# Patient Record
Sex: MALE | Race: WHITE | NOT HISPANIC OR LATINO | Employment: OTHER | URBAN - NONMETROPOLITAN AREA
[De-identification: names, ages, dates, MRNs, and addresses within clinical notes are randomized per-mention and may not be internally consistent; named-entity substitution may affect disease eponyms.]

---

## 2018-11-26 ENCOUNTER — APPOINTMENT (OUTPATIENT)
Dept: GENERAL RADIOLOGY | Facility: OTHER | Age: 73
End: 2018-11-26
Attending: STUDENT IN AN ORGANIZED HEALTH CARE EDUCATION/TRAINING PROGRAM
Payer: MEDICARE

## 2018-11-26 ENCOUNTER — HOSPITAL ENCOUNTER (EMERGENCY)
Facility: OTHER | Age: 73
Discharge: ANOTHER HOSPITAL | End: 2018-11-26
Attending: STUDENT IN AN ORGANIZED HEALTH CARE EDUCATION/TRAINING PROGRAM | Admitting: STUDENT IN AN ORGANIZED HEALTH CARE EDUCATION/TRAINING PROGRAM
Payer: MEDICARE

## 2018-11-26 VITALS
RESPIRATION RATE: 16 BRPM | OXYGEN SATURATION: 95 % | SYSTOLIC BLOOD PRESSURE: 170 MMHG | DIASTOLIC BLOOD PRESSURE: 107 MMHG | HEIGHT: 74 IN | WEIGHT: 270 LBS | HEART RATE: 79 BPM | BODY MASS INDEX: 34.65 KG/M2 | TEMPERATURE: 96.8 F

## 2018-11-26 DIAGNOSIS — I50.43 ACUTE ON CHRONIC COMBINED SYSTOLIC AND DIASTOLIC CONGESTIVE HEART FAILURE (H): ICD-10-CM

## 2018-11-26 DIAGNOSIS — I21.4 NON-STEMI (NON-ST ELEVATED MYOCARDIAL INFARCTION) (H): Primary | ICD-10-CM

## 2018-11-26 DIAGNOSIS — T78.3XXA ANGIOEDEMA, INITIAL ENCOUNTER: ICD-10-CM

## 2018-11-26 LAB
ALBUMIN SERPL-MCNC: 3.7 G/DL (ref 3.5–5.7)
ALP SERPL-CCNC: 63 U/L (ref 34–104)
ALT SERPL W P-5'-P-CCNC: 10 U/L (ref 7–52)
ANION GAP SERPL CALCULATED.3IONS-SCNC: 6 MMOL/L (ref 3–14)
AST SERPL W P-5'-P-CCNC: 13 U/L (ref 13–39)
BASOPHILS # BLD AUTO: 0.1 10E9/L (ref 0–0.2)
BASOPHILS NFR BLD AUTO: 1.3 %
BILIRUB SERPL-MCNC: 0.7 MG/DL (ref 0.3–1)
BUN SERPL-MCNC: 17 MG/DL (ref 7–25)
CALCIUM SERPL-MCNC: 8.9 MG/DL (ref 8.6–10.3)
CHLORIDE SERPL-SCNC: 104 MMOL/L (ref 98–107)
CO2 SERPL-SCNC: 26 MMOL/L (ref 21–31)
CREAT SERPL-MCNC: 0.92 MG/DL (ref 0.7–1.3)
DIFFERENTIAL METHOD BLD: NORMAL
EOSINOPHIL # BLD AUTO: 0.2 10E9/L (ref 0–0.7)
EOSINOPHIL NFR BLD AUTO: 3.1 %
ERYTHROCYTE [DISTWIDTH] IN BLOOD BY AUTOMATED COUNT: 12.9 % (ref 10–15)
GFR SERPL CREATININE-BSD FRML MDRD: 81 ML/MIN/1.7M2
GLUCOSE SERPL-MCNC: 279 MG/DL (ref 70–105)
HCT VFR BLD AUTO: 47.4 % (ref 40–53)
HGB BLD-MCNC: 16.4 G/DL (ref 13.3–17.7)
IMM GRANULOCYTES # BLD: 0 10E9/L (ref 0–0.4)
IMM GRANULOCYTES NFR BLD: 0.3 %
LYMPHOCYTES # BLD AUTO: 1.7 10E9/L (ref 0.8–5.3)
LYMPHOCYTES NFR BLD AUTO: 22.5 %
MCH RBC QN AUTO: 29.5 PG (ref 26.5–33)
MCHC RBC AUTO-ENTMCNC: 34.6 G/DL (ref 31.5–36.5)
MCV RBC AUTO: 85 FL (ref 78–100)
MONOCYTES # BLD AUTO: 0.6 10E9/L (ref 0–1.3)
MONOCYTES NFR BLD AUTO: 7.4 %
NEUTROPHILS # BLD AUTO: 5.1 10E9/L (ref 1.6–8.3)
NEUTROPHILS NFR BLD AUTO: 65.4 %
NT-PROBNP SERPL-MCNC: 195 PG/ML (ref 0–100)
PLATELET # BLD AUTO: 275 10E9/L (ref 150–450)
POTASSIUM SERPL-SCNC: 4.2 MMOL/L (ref 3.5–5.1)
PROT SERPL-MCNC: 6.2 G/DL (ref 6.4–8.9)
RBC # BLD AUTO: 5.55 10E12/L (ref 4.4–5.9)
SODIUM SERPL-SCNC: 136 MMOL/L (ref 134–144)
TROPONIN I SERPL-MCNC: 0.09 UG/L (ref 0–0.03)
WBC # BLD AUTO: 7.7 10E9/L (ref 4–11)

## 2018-11-26 PROCEDURE — A9270 NON-COVERED ITEM OR SERVICE: HCPCS | Mod: GZ | Performed by: STUDENT IN AN ORGANIZED HEALTH CARE EDUCATION/TRAINING PROGRAM

## 2018-11-26 PROCEDURE — 25000132 ZZH RX MED GY IP 250 OP 250 PS 637: Performed by: STUDENT IN AN ORGANIZED HEALTH CARE EDUCATION/TRAINING PROGRAM

## 2018-11-26 PROCEDURE — 71046 X-RAY EXAM CHEST 2 VIEWS: CPT

## 2018-11-26 PROCEDURE — 36415 COLL VENOUS BLD VENIPUNCTURE: CPT | Performed by: STUDENT IN AN ORGANIZED HEALTH CARE EDUCATION/TRAINING PROGRAM

## 2018-11-26 PROCEDURE — 83880 ASSAY OF NATRIURETIC PEPTIDE: CPT | Performed by: STUDENT IN AN ORGANIZED HEALTH CARE EDUCATION/TRAINING PROGRAM

## 2018-11-26 PROCEDURE — 93010 ELECTROCARDIOGRAM REPORT: CPT | Performed by: INTERNAL MEDICINE

## 2018-11-26 PROCEDURE — 25000125 ZZHC RX 250: Performed by: STUDENT IN AN ORGANIZED HEALTH CARE EDUCATION/TRAINING PROGRAM

## 2018-11-26 PROCEDURE — 93005 ELECTROCARDIOGRAM TRACING: CPT | Performed by: STUDENT IN AN ORGANIZED HEALTH CARE EDUCATION/TRAINING PROGRAM

## 2018-11-26 PROCEDURE — 96376 TX/PRO/DX INJ SAME DRUG ADON: CPT | Performed by: STUDENT IN AN ORGANIZED HEALTH CARE EDUCATION/TRAINING PROGRAM

## 2018-11-26 PROCEDURE — 80053 COMPREHEN METABOLIC PANEL: CPT | Performed by: STUDENT IN AN ORGANIZED HEALTH CARE EDUCATION/TRAINING PROGRAM

## 2018-11-26 PROCEDURE — 84484 ASSAY OF TROPONIN QUANT: CPT | Performed by: STUDENT IN AN ORGANIZED HEALTH CARE EDUCATION/TRAINING PROGRAM

## 2018-11-26 PROCEDURE — 96375 TX/PRO/DX INJ NEW DRUG ADDON: CPT | Performed by: STUDENT IN AN ORGANIZED HEALTH CARE EDUCATION/TRAINING PROGRAM

## 2018-11-26 PROCEDURE — 85025 COMPLETE CBC W/AUTO DIFF WBC: CPT | Performed by: STUDENT IN AN ORGANIZED HEALTH CARE EDUCATION/TRAINING PROGRAM

## 2018-11-26 PROCEDURE — 99285 EMERGENCY DEPT VISIT HI MDM: CPT | Mod: Z6 | Performed by: STUDENT IN AN ORGANIZED HEALTH CARE EDUCATION/TRAINING PROGRAM

## 2018-11-26 PROCEDURE — 96365 THER/PROPH/DIAG IV INF INIT: CPT | Performed by: STUDENT IN AN ORGANIZED HEALTH CARE EDUCATION/TRAINING PROGRAM

## 2018-11-26 PROCEDURE — 99285 EMERGENCY DEPT VISIT HI MDM: CPT | Mod: 25 | Performed by: STUDENT IN AN ORGANIZED HEALTH CARE EDUCATION/TRAINING PROGRAM

## 2018-11-26 PROCEDURE — 25000128 H RX IP 250 OP 636: Performed by: STUDENT IN AN ORGANIZED HEALTH CARE EDUCATION/TRAINING PROGRAM

## 2018-11-26 RX ORDER — HEPARIN SODIUM 5000 [USP'U]/.5ML
4000 INJECTION, SOLUTION INTRAVENOUS; SUBCUTANEOUS ONCE
Status: COMPLETED | OUTPATIENT
Start: 2018-11-26 | End: 2018-11-26

## 2018-11-26 RX ORDER — FUROSEMIDE 10 MG/ML
40 INJECTION INTRAMUSCULAR; INTRAVENOUS ONCE
Status: COMPLETED | OUTPATIENT
Start: 2018-11-26 | End: 2018-11-26

## 2018-11-26 RX ORDER — HEPARIN SODIUM 10000 [USP'U]/100ML
0-3500 INJECTION, SOLUTION INTRAVENOUS CONTINUOUS
Status: DISCONTINUED | OUTPATIENT
Start: 2018-11-26 | End: 2018-11-26 | Stop reason: HOSPADM

## 2018-11-26 RX ORDER — DIPHENHYDRAMINE HYDROCHLORIDE 50 MG/ML
25 INJECTION INTRAMUSCULAR; INTRAVENOUS ONCE
Status: COMPLETED | OUTPATIENT
Start: 2018-11-26 | End: 2018-11-26

## 2018-11-26 RX ORDER — METOPROLOL TARTRATE 1 MG/ML
5 INJECTION, SOLUTION INTRAVENOUS ONCE
Status: COMPLETED | OUTPATIENT
Start: 2018-11-26 | End: 2018-11-26

## 2018-11-26 RX ORDER — HEPARIN SODIUM 5000 [USP'U]/.5ML
INJECTION, SOLUTION INTRAVENOUS; SUBCUTANEOUS EVERY 6 HOURS PRN
Status: DISCONTINUED | OUTPATIENT
Start: 2018-11-26 | End: 2018-11-26 | Stop reason: HOSPADM

## 2018-11-26 RX ORDER — ASPIRIN 325 MG
325 TABLET ORAL ONCE
Status: COMPLETED | OUTPATIENT
Start: 2018-11-26 | End: 2018-11-26

## 2018-11-26 RX ORDER — METOPROLOL TARTRATE 25 MG/1
25 TABLET, FILM COATED ORAL ONCE
Status: COMPLETED | OUTPATIENT
Start: 2018-11-26 | End: 2018-11-26

## 2018-11-26 RX ORDER — METHYLPREDNISOLONE SODIUM SUCCINATE 125 MG/2ML
125 INJECTION, POWDER, LYOPHILIZED, FOR SOLUTION INTRAMUSCULAR; INTRAVENOUS ONCE
Status: COMPLETED | OUTPATIENT
Start: 2018-11-26 | End: 2018-11-26

## 2018-11-26 RX ORDER — ATORVASTATIN CALCIUM 40 MG/1
40 TABLET, FILM COATED ORAL DAILY
Status: DISCONTINUED | OUTPATIENT
Start: 2018-11-26 | End: 2018-11-26 | Stop reason: HOSPADM

## 2018-11-26 RX ADMIN — FUROSEMIDE 40 MG: 10 INJECTION, SOLUTION INTRAMUSCULAR; INTRAVENOUS at 14:13

## 2018-11-26 RX ADMIN — METHYLPREDNISOLONE SODIUM SUCCINATE 125 MG: 125 INJECTION, POWDER, FOR SOLUTION INTRAMUSCULAR; INTRAVENOUS at 12:26

## 2018-11-26 RX ADMIN — TICAGRELOR 180 MG: 90 TABLET ORAL at 14:14

## 2018-11-26 RX ADMIN — HEPARIN SODIUM 4000 UNITS: 10000 INJECTION, SOLUTION INTRAVENOUS; SUBCUTANEOUS at 14:27

## 2018-11-26 RX ADMIN — ATORVASTATIN CALCIUM 40 MG: 40 TABLET, FILM COATED ORAL at 14:36

## 2018-11-26 RX ADMIN — METOPROLOL TARTRATE 5 MG: 1 INJECTION, SOLUTION INTRAVENOUS at 14:37

## 2018-11-26 RX ADMIN — DIPHENHYDRAMINE HYDROCHLORIDE 25 MG: 50 INJECTION INTRAMUSCULAR; INTRAVENOUS at 12:26

## 2018-11-26 RX ADMIN — METOPROLOL TARTRATE 25 MG: 25 TABLET ORAL at 14:37

## 2018-11-26 RX ADMIN — ASPIRIN 325 MG ORAL TABLET 325 MG: 325 PILL ORAL at 14:14

## 2018-11-26 RX ADMIN — HEPARIN SODIUM 1000 UNITS/HR: 10000 INJECTION, SOLUTION INTRAVENOUS at 14:28

## 2018-11-26 ASSESSMENT — ENCOUNTER SYMPTOMS
WEAKNESS: 1
ABDOMINAL PAIN: 0
DYSURIA: 0
COUGH: 1
FEVER: 0
WHEEZING: 0
HEADACHES: 0
SHORTNESS OF BREATH: 1
CHILLS: 0
ABDOMINAL DISTENTION: 1
DIZZINESS: 0
PALPITATIONS: 0

## 2018-11-26 NOTE — ED PROVIDER NOTES
History     Chief Complaint   Patient presents with     Shortness of Breath     HPI  Lefty Quigley is a 73 year old male with a chronic history of high blood pressure presents to the emergency room with complaints of difficulty swallowing, thickening of his tongue and exertional shortness of breath.  Patient also reports shortness of breath when he lays down flat with associated difficulty breathing which improves when he sits upright on an incline position.  He has had chronic nonproductive cough.  He reports that his symptoms started getting worse 5 days ago after his enalapril was increased from 20 mg to 40 mg.  He reports difficulty swallowing liquid and solid.  He denies lip swelling.  He reports worsening shortness of breath with exertion after walking about 5 feet.  He denies leg swellings,chest pain, headache, dizziness or lightheadedness.  His blood pressure has remained chronically elevated in the 160s-170s systolic and is currently working with his primary care provider to get that under control.  Wife at the bedside also reports that patient has been having some nervous breakdown with erratic crying spells which is new for him. Chart review reveals that patient has EF- 45% and Grade 2 diastolic dysfunction for Echo done in 2015.     Problem List:    There are no active problems to display for this patient.       Past Medical History:    History reviewed. No pertinent past medical history.    Past Surgical History:    History reviewed. No pertinent surgical history.    Family History:    No family history on file.    Social History:  Marital Status:   [2]  Social History   Substance Use Topics     Smoking status: Never Smoker     Smokeless tobacco: Never Used     Alcohol use No        Medications:      ASPIRIN ADULT LOW STRENGTH PO   ENALAPRIL MALEATE PO   Insulin Aspart (NOVOLOG SC)   insulin glargine (LANTUS SOLOSTAR PEN) 100 UNIT/ML pen   VITAMIN D, CHOLECALCIFEROL, PO         Review of  "Systems   Constitutional: Negative for chills and fever.   Respiratory: Positive for cough and shortness of breath. Negative for wheezing.    Cardiovascular: Negative for chest pain, palpitations and leg swelling.   Gastrointestinal: Positive for abdominal distention. Negative for abdominal pain.   Genitourinary: Negative for dysuria.   Musculoskeletal: Negative for gait problem.   Neurological: Positive for weakness. Negative for dizziness and headaches.       Physical Exam   BP: (!) 164/97  Pulse: 79  Heart Rate: 88  Temp: 96.8  F (36  C)  Resp: 25  Height: 188 cm (6' 2\")  Weight: 122.5 kg (270 lb)  SpO2: 94 %      Physical Exam   Constitutional: He is oriented to person, place, and time. He appears well-developed. No distress.   HENT:   Head: Normocephalic and atraumatic.   Mouth/Throat: Oropharynx is clear and moist. No oropharyngeal exudate.   Oropharynx clear with swollen tongue and uvula which is midline.    Eyes: Conjunctivae are normal. Pupils are equal, round, and reactive to light.   Neck: Normal range of motion. No JVD present. No tracheal deviation present. No thyromegaly present.   Cardiovascular: Normal rate, regular rhythm and normal heart sounds.    Pulmonary/Chest: Effort normal and breath sounds normal. No stridor. No respiratory distress.   Abdominal: Soft. Bowel sounds are normal. He exhibits distension. There is no tenderness. There is no rebound.   Musculoskeletal: Normal range of motion. He exhibits no edema, tenderness or deformity.   Neurological: He is alert and oriented to person, place, and time. No cranial nerve deficit. Coordination normal.   Skin: He is not diaphoretic.       ED Course     ED Course     Procedures               EKG Interpretation:      Interpreted by Daniele Magallanes    Symptoms at time of EKG: SOB  Rhythm: normal sinus   Rate: normal  Axis: normal  Ectopy: none  Conduction: normal  ST Segments/ T Waves: No acute ST-T wave changes  Q Waves: none  Comparison to prior: " Unchanged    Clinical Impression: normal EKG    Critical Care time:  none    Results for orders placed or performed during the hospital encounter of 11/26/18 (from the past 24 hour(s))   CBC with platelets differential   Result Value Ref Range    WBC 7.7 4.0 - 11.0 10e9/L    RBC Count 5.55 4.4 - 5.9 10e12/L    Hemoglobin 16.4 13.3 - 17.7 g/dL    Hematocrit 47.4 40.0 - 53.0 %    MCV 85 78 - 100 fl    MCH 29.5 26.5 - 33.0 pg    MCHC 34.6 31.5 - 36.5 g/dL    RDW 12.9 10.0 - 15.0 %    Platelet Count 275 150 - 450 10e9/L    Diff Method Automated Method     % Neutrophils 65.4 %    % Lymphocytes 22.5 %    % Monocytes 7.4 %    % Eosinophils 3.1 %    % Basophils 1.3 %    % Immature Granulocytes 0.3 %    Absolute Neutrophil 5.1 1.6 - 8.3 10e9/L    Absolute Lymphocytes 1.7 0.8 - 5.3 10e9/L    Absolute Monocytes 0.6 0.0 - 1.3 10e9/L    Absolute Eosinophils 0.2 0.0 - 0.7 10e9/L    Absolute Basophils 0.1 0.0 - 0.2 10e9/L    Abs Immature Granulocytes 0.0 0 - 0.4 10e9/L   Comprehensive metabolic panel   Result Value Ref Range    Sodium 136 134 - 144 mmol/L    Potassium 4.2 3.5 - 5.1 mmol/L    Chloride 104 98 - 107 mmol/L    Carbon Dioxide 26 21 - 31 mmol/L    Anion Gap 6 3 - 14 mmol/L    Glucose 279 (H) 70 - 105 mg/dL    Urea Nitrogen 17 7 - 25 mg/dL    Creatinine 0.92 0.70 - 1.30 mg/dL    GFR Estimate 81 >60 mL/min/1.7m2    GFR Estimate If Black >90 >60 mL/min/1.7m2    Calcium 8.9 8.6 - 10.3 mg/dL    Bilirubin Total 0.7 0.3 - 1.0 mg/dL    Albumin 3.7 3.5 - 5.7 g/dL    Protein Total 6.2 (L) 6.4 - 8.9 g/dL    Alkaline Phosphatase 63 34 - 104 U/L    ALT 10 7 - 52 U/L    AST 13 13 - 39 U/L   Troponin I   Result Value Ref Range    Troponin I ES 0.093 (H) 0.000 - 0.034 ug/L   Nt probnp inpatient   Result Value Ref Range    N-Terminal Pro BNP Inpatient 195 (H) 0 - 100 pg/mL   XR Chest 2 Views    Narrative    PROCEDURE:  XR CHEST 2 VW    HISTORY: SOB on exertion.; , .    COMPARISON:  None.    FINDINGS:  The cardiomediastinal contours are  upper normal.  The trachea is  midline.  There is calcific aortic atherosclerosis.  There is blunting the posterior costophrenic sulci. No discrete  consolidation or pneumothorax is seen.    No suspicious osseous lesion or subdiaphragmatic free air.      Impression    IMPRESSION:      Possible trace pleural effusions versus posterior base scarring.      MARIA C RIVERA MD       Medications   diphenhydrAMINE (BENADRYL) injection 25 mg (25 mg Intravenous Given 11/26/18 1226)   methylPREDNISolone sodium succinate (solu-MEDROL) injection 125 mg (125 mg Intravenous Given 11/26/18 1226)   aspirin (ASA) tablet 325 mg (325 mg Oral Given 11/26/18 1414)   ticagrelor (BRILINTA) tablet 180 mg (180 mg Oral Given 11/26/18 1414)   heparin loading dose for LOW INTENSITY TREATMENT* Give BEFORE starting heparin infusion (4,000 Units Intravenous Given 11/26/18 1427)   furosemide (LASIX) injection 40 mg (40 mg Intravenous Given 11/26/18 1413)   metoprolol (LOPRESSOR) injection 5 mg (5 mg Intravenous Given 11/26/18 1437)   metoprolol tartrate (LOPRESSOR) tablet 25 mg (25 mg Oral Given 11/26/18 1437)       Assessments & Plan (with Medical Decision Making)   Patient is a 46 year old male with a history of poorly controlled hypertension and congestive heart failure with reduced EF presenting with tongue and throat swelling in the setting of ACEI use as well as orthopnea, PND and worsening exertional dyspnea. Objective assessment with elevated troponin's but no acute ST EKG changes. Elevated BNP and bilateral pleural effusion in the setting of his history and previous Echo is concerning for acute on chronic CHF exacerbation. Patient received steroids and benadryl for his angioedema. Epinephrine was held due to his elevated BP. Dual antiplatelets and heparin drip was commenced due to concern for ACS. He was then transferred to [facility].     I have reviewed the nursing notes.    I have reviewed the findings, diagnosis,  plan and need for follow up with the patient.       HEART Score  Background  Calculates the overall risk of adverse event in patient's presenting with chest pain.  Based on 5 criteria (each assigned 0-2 points) including suspiciousness of history, EKG, age, risk factors and troponin.    Data  73 year old male   does not have a problem list on file.   reports that he has never smoked. He has never used smokeless tobacco.  family history is not on file.  Lab Results   Component Value Date    TROPI 0.093 11/26/2018     Criteria   0-2 points for each of 5 items (maximum of 10 points):  Score 1- History moderately suspicious for coronary syndrome  Score 0- EKG Normal  Score 1- Age 45 to 65 years old  Score 1- One to 2 risk factors for atherosclerotic disease  Score 1- One to 2 times the normal troponin upper limit  Interpretation  Risk of adverse outcome  Heart Score: 4  Total Score 4-6- Adverse Outcome Risk 20.3% - Supports admission with standard rule-out management -serial troponins and stress testing        Discharge Medication List as of 11/26/2018  3:42 PM          Final diagnoses:   Angioedema, initial encounter   Non-STEMI (non-ST elevated myocardial infarction) (H)   Acute on chronic combined systolic and diastolic congestive heart failure (H)       11/26/2018   [facility]     Daniele Magallanes MD  11/26/18 2009

## 2018-11-26 NOTE — ED TRIAGE NOTES
Pt to ER from home with wife. [facility], instructed to double his BP med enalopril to 40 mg.  Gradually tongue thick causing slurred speech, dry cough, weakness.  Wife gave charcoal this AM thinking this was all side effect of enalopril.  Hx DM.  BP/ this /90. Pt emotionally inappropriate, crying or laughing hysterically at inappropriate times.  Took 20mg enalopril at 0400.  Wife states pt not thinking straight.  BP in triage 182/110.  Denies cardiac hx. Hx TIA.  Pt states increasing SOB and heaviness to chest over the past week.  Pt states severely SOB with exhertion, moderate with rest.

## 2018-11-26 NOTE — ED TRIAGE NOTES
Pt also states difficulty swallowing food and water.  Pt just started to cry.  Wife states this emotional change happened once before when hospitalized for TIA.

## 2018-12-03 ENCOUNTER — TRANSFERRED RECORDS (OUTPATIENT)
Dept: HEALTH INFORMATION MANAGEMENT | Facility: OTHER | Age: 73
End: 2018-12-03

## 2019-01-16 ENCOUNTER — DOCUMENTATION ONLY (OUTPATIENT)
Dept: OTHER | Facility: CLINIC | Age: 74
End: 2019-01-16

## 2019-01-22 ENCOUNTER — APPOINTMENT (OUTPATIENT)
Dept: ULTRASOUND IMAGING | Facility: OTHER | Age: 74
End: 2019-01-22
Payer: MEDICARE

## 2019-01-22 ENCOUNTER — HOSPITAL ENCOUNTER (EMERGENCY)
Facility: OTHER | Age: 74
Discharge: HOME | End: 2019-01-22
Attending: STUDENT IN AN ORGANIZED HEALTH CARE EDUCATION/TRAINING PROGRAM | Admitting: STUDENT IN AN ORGANIZED HEALTH CARE EDUCATION/TRAINING PROGRAM
Payer: MEDICARE

## 2019-01-22 VITALS
HEIGHT: 74 IN | TEMPERATURE: 97.7 F | DIASTOLIC BLOOD PRESSURE: 85 MMHG | BODY MASS INDEX: 34.67 KG/M2 | OXYGEN SATURATION: 93 % | SYSTOLIC BLOOD PRESSURE: 139 MMHG | RESPIRATION RATE: 17 BRPM | HEART RATE: 81 BPM

## 2019-01-22 DIAGNOSIS — K81.9 CHOLECYSTITIS: ICD-10-CM

## 2019-01-22 PROBLEM — Z78.9 IMPAIRED MOBILITY AND ACTIVITIES OF DAILY LIVING: Status: ACTIVE | Noted: 2018-12-03

## 2019-01-22 PROBLEM — R53.1 WEAKNESS: Status: ACTIVE | Noted: 2019-01-03

## 2019-01-22 PROBLEM — Z74.09 IMPAIRED MOBILITY AND ACTIVITIES OF DAILY LIVING: Status: ACTIVE | Noted: 2018-12-03

## 2019-01-22 PROBLEM — R31.9 HEMATURIA: Status: ACTIVE | Noted: 2018-11-26

## 2019-01-22 PROBLEM — I50.20 UNSPECIFIED SYSTOLIC (CONGESTIVE) HEART FAILURE (H): Status: ACTIVE | Noted: 2019-01-03

## 2019-01-22 PROBLEM — I16.1 HYPERTENSIVE EMERGENCY: Status: ACTIVE | Noted: 2018-11-26

## 2019-01-22 PROBLEM — R13.12 OROPHARYNGEAL DYSPHAGIA: Status: ACTIVE | Noted: 2018-12-03

## 2019-01-22 PROBLEM — T78.3XXA ANGIOEDEMA: Status: ACTIVE | Noted: 2018-11-26

## 2019-01-22 PROBLEM — K80.00 ACUTE CHOLECYSTITIS DUE TO BILIARY CALCULUS: Status: ACTIVE | Noted: 2019-01-22

## 2019-01-22 PROBLEM — I63.9 ACUTE EMBOLIC STROKE (H): Status: ACTIVE | Noted: 2018-11-27

## 2019-01-22 LAB
ALBUMIN SERPL-MCNC: 4.3 G/DL (ref 3.5–5.7)
ALBUMIN UR-MCNC: 100 MG/DL
ALP SERPL-CCNC: 70 U/L (ref 34–104)
ALT SERPL W P-5'-P-CCNC: 12 U/L (ref 7–52)
ANION GAP SERPL CALCULATED.3IONS-SCNC: 12 MMOL/L (ref 3–14)
APPEARANCE UR: CLEAR
APTT PPP: 39 SEC (ref 29–50)
AST SERPL W P-5'-P-CCNC: 13 U/L (ref 13–39)
BILIRUB SERPL-MCNC: 0.9 MG/DL (ref 0.3–1)
BILIRUB UR QL STRIP: NEGATIVE
BUN SERPL-MCNC: 20 MG/DL (ref 7–25)
CALCIUM SERPL-MCNC: 9.7 MG/DL (ref 8.6–10.3)
CHLORIDE SERPL-SCNC: 103 MMOL/L (ref 98–107)
CO2 SERPL-SCNC: 25 MMOL/L (ref 21–31)
COLOR UR AUTO: YELLOW
CREAT SERPL-MCNC: 0.77 MG/DL (ref 0.7–1.3)
DIFFERENTIAL METHOD BLD: ABNORMAL
ERYTHROCYTE [DISTWIDTH] IN BLOOD BY AUTOMATED COUNT: 13.1 % (ref 10–15)
GFR SERPL CREATININE-BSD FRML MDRD: >90 ML/MIN/{1.73_M2}
GLUCOSE SERPL-MCNC: 209 MG/DL (ref 70–105)
GLUCOSE UR STRIP-MCNC: 100 MG/DL
HCT VFR BLD AUTO: 43.8 % (ref 40–53)
HGB BLD-MCNC: 15.4 G/DL (ref 13.3–17.7)
HGB UR QL STRIP: ABNORMAL
INR PPP: 1 (ref 0–1.3)
KETONES UR STRIP-MCNC: 15 MG/DL
LACTATE SERPL-SCNC: 0.7 MMOL/L (ref 0.5–2.2)
LEUKOCYTE ESTERASE UR QL STRIP: NEGATIVE
LIPASE SERPL-CCNC: 31 U/L (ref 11–82)
MCH RBC QN AUTO: 29.8 PG (ref 26.5–33)
MCHC RBC AUTO-ENTMCNC: 35.2 G/DL (ref 31.5–36.5)
MCV RBC AUTO: 85 FL (ref 78–100)
NITRATE UR QL: NEGATIVE
NON-SQ EPI CELLS #/AREA URNS LPF: NORMAL /LPF
NT-PROBNP SERPL-MCNC: 99 PG/ML (ref 0–100)
PH UR STRIP: 6.5 PH (ref 5–9)
PLATELET # BLD AUTO: 288 10E9/L (ref 150–450)
POTASSIUM SERPL-SCNC: 3.5 MMOL/L (ref 3.5–5.1)
PROT SERPL-MCNC: 6.5 G/DL (ref 6.4–8.9)
RBC # BLD AUTO: 5.16 10E12/L (ref 4.4–5.9)
RBC #/AREA URNS AUTO: NORMAL /HPF
SODIUM SERPL-SCNC: 140 MMOL/L (ref 134–144)
SOURCE: ABNORMAL
SP GR UR STRIP: >1.03 (ref 1–1.03)
TROPONIN I SERPL-MCNC: 0.06 UG/L (ref 0–0.03)
TROPONIN I SERPL-MCNC: 0.06 UG/L (ref 0–0.03)
UROBILINOGEN UR STRIP-ACNC: 0.2 EU/DL (ref 0.2–1)
WBC # BLD AUTO: 12.2 10E9/L (ref 4–11)
WBC #/AREA URNS AUTO: NORMAL /HPF

## 2019-01-22 PROCEDURE — 93010 ELECTROCARDIOGRAM REPORT: CPT | Performed by: INTERNAL MEDICINE

## 2019-01-22 PROCEDURE — 99214 OFFICE O/P EST MOD 30 MIN: CPT | Performed by: SURGERY

## 2019-01-22 PROCEDURE — 36415 COLL VENOUS BLD VENIPUNCTURE: CPT | Performed by: EMERGENCY MEDICINE

## 2019-01-22 PROCEDURE — 85730 THROMBOPLASTIN TIME PARTIAL: CPT | Performed by: STUDENT IN AN ORGANIZED HEALTH CARE EDUCATION/TRAINING PROGRAM

## 2019-01-22 PROCEDURE — 93005 ELECTROCARDIOGRAM TRACING: CPT | Performed by: STUDENT IN AN ORGANIZED HEALTH CARE EDUCATION/TRAINING PROGRAM

## 2019-01-22 PROCEDURE — 84484 ASSAY OF TROPONIN QUANT: CPT | Performed by: EMERGENCY MEDICINE

## 2019-01-22 PROCEDURE — 96374 THER/PROPH/DIAG INJ IV PUSH: CPT | Performed by: STUDENT IN AN ORGANIZED HEALTH CARE EDUCATION/TRAINING PROGRAM

## 2019-01-22 PROCEDURE — 85610 PROTHROMBIN TIME: CPT | Performed by: STUDENT IN AN ORGANIZED HEALTH CARE EDUCATION/TRAINING PROGRAM

## 2019-01-22 PROCEDURE — 76705 ECHO EXAM OF ABDOMEN: CPT | Mod: TC

## 2019-01-22 PROCEDURE — 84484 ASSAY OF TROPONIN QUANT: CPT | Performed by: STUDENT IN AN ORGANIZED HEALTH CARE EDUCATION/TRAINING PROGRAM

## 2019-01-22 PROCEDURE — 99284 EMERGENCY DEPT VISIT MOD MDM: CPT | Mod: Z6 | Performed by: STUDENT IN AN ORGANIZED HEALTH CARE EDUCATION/TRAINING PROGRAM

## 2019-01-22 PROCEDURE — A9270 NON-COVERED ITEM OR SERVICE: HCPCS | Mod: GY | Performed by: EMERGENCY MEDICINE

## 2019-01-22 PROCEDURE — 25000128 H RX IP 250 OP 636: Performed by: STUDENT IN AN ORGANIZED HEALTH CARE EDUCATION/TRAINING PROGRAM

## 2019-01-22 PROCEDURE — 96376 TX/PRO/DX INJ SAME DRUG ADON: CPT | Performed by: STUDENT IN AN ORGANIZED HEALTH CARE EDUCATION/TRAINING PROGRAM

## 2019-01-22 PROCEDURE — 81001 URINALYSIS AUTO W/SCOPE: CPT | Performed by: STUDENT IN AN ORGANIZED HEALTH CARE EDUCATION/TRAINING PROGRAM

## 2019-01-22 PROCEDURE — 96375 TX/PRO/DX INJ NEW DRUG ADDON: CPT | Performed by: STUDENT IN AN ORGANIZED HEALTH CARE EDUCATION/TRAINING PROGRAM

## 2019-01-22 PROCEDURE — 83605 ASSAY OF LACTIC ACID: CPT | Performed by: STUDENT IN AN ORGANIZED HEALTH CARE EDUCATION/TRAINING PROGRAM

## 2019-01-22 PROCEDURE — 83880 ASSAY OF NATRIURETIC PEPTIDE: CPT | Performed by: STUDENT IN AN ORGANIZED HEALTH CARE EDUCATION/TRAINING PROGRAM

## 2019-01-22 PROCEDURE — 80053 COMPREHEN METABOLIC PANEL: CPT | Performed by: STUDENT IN AN ORGANIZED HEALTH CARE EDUCATION/TRAINING PROGRAM

## 2019-01-22 PROCEDURE — 25000132 ZZH RX MED GY IP 250 OP 250 PS 637: Mod: GY | Performed by: EMERGENCY MEDICINE

## 2019-01-22 PROCEDURE — 99285 EMERGENCY DEPT VISIT HI MDM: CPT | Mod: 25 | Performed by: STUDENT IN AN ORGANIZED HEALTH CARE EDUCATION/TRAINING PROGRAM

## 2019-01-22 PROCEDURE — 85025 COMPLETE CBC W/AUTO DIFF WBC: CPT | Performed by: STUDENT IN AN ORGANIZED HEALTH CARE EDUCATION/TRAINING PROGRAM

## 2019-01-22 PROCEDURE — 25000128 H RX IP 250 OP 636: Performed by: EMERGENCY MEDICINE

## 2019-01-22 PROCEDURE — 36415 COLL VENOUS BLD VENIPUNCTURE: CPT | Performed by: STUDENT IN AN ORGANIZED HEALTH CARE EDUCATION/TRAINING PROGRAM

## 2019-01-22 PROCEDURE — 83690 ASSAY OF LIPASE: CPT | Performed by: STUDENT IN AN ORGANIZED HEALTH CARE EDUCATION/TRAINING PROGRAM

## 2019-01-22 RX ORDER — HYDROCODONE BITARTRATE AND ACETAMINOPHEN 5; 325 MG/1; MG/1
1 TABLET ORAL EVERY 6 HOURS PRN
Qty: 18 TABLET | Refills: 0 | Status: SHIPPED | OUTPATIENT
Start: 2019-01-22 | End: 2019-01-25

## 2019-01-22 RX ORDER — HYDRALAZINE HYDROCHLORIDE 50 MG/1
50 TABLET, FILM COATED ORAL 3 TIMES DAILY
Status: ON HOLD | COMMUNITY
Start: 2019-01-08 | End: 2021-08-10

## 2019-01-22 RX ORDER — ATORVASTATIN CALCIUM 40 MG/1
80 TABLET, FILM COATED ORAL
COMMUNITY
Start: 2019-01-08 | End: 2019-04-08

## 2019-01-22 RX ORDER — SODIUM CHLORIDE 9 MG/ML
1000 INJECTION, SOLUTION INTRAVENOUS CONTINUOUS
Status: DISCONTINUED | OUTPATIENT
Start: 2019-01-22 | End: 2019-01-22

## 2019-01-22 RX ORDER — ONDANSETRON 2 MG/ML
4 INJECTION INTRAMUSCULAR; INTRAVENOUS ONCE
Status: COMPLETED | OUTPATIENT
Start: 2019-01-22 | End: 2019-01-22

## 2019-01-22 RX ORDER — CARVEDILOL 12.5 MG/1
25 TABLET ORAL ONCE
Status: COMPLETED | OUTPATIENT
Start: 2019-01-22 | End: 2019-01-22

## 2019-01-22 RX ORDER — HYDROCHLOROTHIAZIDE 50 MG/1
50 TABLET ORAL DAILY
COMMUNITY
Start: 2019-01-09

## 2019-01-22 RX ORDER — ISOSORBIDE DINITRATE 10 MG/1
10 TABLET ORAL 3 TIMES DAILY
Status: ON HOLD | COMMUNITY
Start: 2019-01-15 | End: 2021-08-10

## 2019-01-22 RX ORDER — HYDRALAZINE HYDROCHLORIDE 25 MG/1
50 TABLET, FILM COATED ORAL ONCE
Status: COMPLETED | OUTPATIENT
Start: 2019-01-22 | End: 2019-01-22

## 2019-01-22 RX ORDER — FENTANYL CITRATE 50 UG/ML
50 INJECTION, SOLUTION INTRAMUSCULAR; INTRAVENOUS ONCE
Status: COMPLETED | OUTPATIENT
Start: 2019-01-22 | End: 2019-01-22

## 2019-01-22 RX ORDER — CLOPIDOGREL BISULFATE 75 MG/1
75 TABLET ORAL
COMMUNITY
Start: 2019-01-08 | End: 2019-04-08

## 2019-01-22 RX ORDER — ONDANSETRON 4 MG/1
4 TABLET, ORALLY DISINTEGRATING ORAL EVERY 8 HOURS PRN
Qty: 10 TABLET | Refills: 0 | Status: SHIPPED | OUTPATIENT
Start: 2019-01-22 | End: 2019-01-25

## 2019-01-22 RX ORDER — LABETALOL HYDROCHLORIDE 5 MG/ML
10 INJECTION, SOLUTION INTRAVENOUS ONCE
Status: COMPLETED | OUTPATIENT
Start: 2019-01-22 | End: 2019-01-22

## 2019-01-22 RX ORDER — ISOSORBIDE DINITRATE 10 MG/1
10 TABLET ORAL ONCE
Status: COMPLETED | OUTPATIENT
Start: 2019-01-22 | End: 2019-01-22

## 2019-01-22 RX ORDER — CIPROFLOXACIN 500 MG/1
500 TABLET, FILM COATED ORAL 2 TIMES DAILY
Qty: 10 TABLET | Refills: 0 | Status: SHIPPED | OUTPATIENT
Start: 2019-01-22 | End: 2019-01-27

## 2019-01-22 RX ORDER — CARVEDILOL 25 MG/1
25 TABLET ORAL 2 TIMES DAILY WITH MEALS
COMMUNITY
Start: 2019-01-08

## 2019-01-22 RX ORDER — HYDROCHLOROTHIAZIDE 25 MG/1
50 TABLET ORAL ONCE
Status: COMPLETED | OUTPATIENT
Start: 2019-01-22 | End: 2019-01-22

## 2019-01-22 RX ADMIN — HYDRALAZINE HYDROCHLORIDE 50 MG: 25 TABLET, FILM COATED ORAL at 08:28

## 2019-01-22 RX ADMIN — FENTANYL CITRATE 50 MCG: 50 INJECTION, SOLUTION INTRAMUSCULAR; INTRAVENOUS at 13:45

## 2019-01-22 RX ADMIN — ISOSORBIDE DINITRATE 10 MG: 10 TABLET ORAL at 08:28

## 2019-01-22 RX ADMIN — LABETALOL HYDROCHLORIDE 10 MG: 5 INJECTION INTRAVENOUS at 07:39

## 2019-01-22 RX ADMIN — ONDANSETRON 4 MG: 2 INJECTION INTRAMUSCULAR; INTRAVENOUS at 08:26

## 2019-01-22 RX ADMIN — HYDROCHLOROTHIAZIDE 50 MG: 25 TABLET ORAL at 08:28

## 2019-01-22 RX ADMIN — FENTANYL CITRATE 50 MCG: 50 INJECTION INTRAMUSCULAR; INTRAVENOUS at 08:27

## 2019-01-22 RX ADMIN — CARVEDILOL 25 MG: 12.5 TABLET, FILM COATED ORAL at 08:28

## 2019-01-22 ASSESSMENT — ENCOUNTER SYMPTOMS
LIGHT-HEADEDNESS: 0
COUGH: 0
RHINORRHEA: 1
FEVER: 0
VOMITING: 1
NAUSEA: 1
WEAKNESS: 1
DIZZINESS: 0
CHILLS: 0
DYSURIA: 0
SHORTNESS OF BREATH: 1
ABDOMINAL PAIN: 0

## 2019-01-22 NOTE — ED TRIAGE NOTES
"Pt comes to the ER via ambulance reporting \"diaphragm\" discomfort, vomiting, and epigastric pain.  Woke up around 0100 this morning with the pain. Took charcoal to help \"Soak up whatever was there\" and vomited it right up.  Pt has a history of  A stroke.   Vitals en route 186/99, NSR 81 with inverted T waves in lateral leads, temp 97.7.   IV started, 162 asa given.  Pt was fine when he ate at 1800 and felt sick at 0130  Comes from home with wife.   "

## 2019-01-22 NOTE — ED AVS SNAPSHOT
[facility]  [address]  [place] [address]  Phone:  [phone]  Fax:  [phone]                                    Lefty Quigley   MRN: [id number]    Department:  [facility]   Date of Visit:  1/22/2019           After Visit Summary Signature Page    I have received my discharge instructions, and my questions have been answered. I have discussed any challenges I see with this plan with the nurse or doctor.    ..........................................................................................................................................  Patient/Patient Representative Signature      ..........................................................................................................................................  Patient Representative Print Name and Relationship to Patient    ..................................................               ................................................  Date                                   Time    ..........................................................................................................................................  Reviewed by Signature/Title    ...................................................              ..............................................  Date                                               Time          22EPIC Rev 08/18

## 2019-01-22 NOTE — CONSULTS
Surgical ER Consult  Referring physician:  NANDO Haywood  Primary physician:     Piyush Luo    Chief complaint:   Cholecystitis    History of present illness:  This is a 73 year old male I am seeing in consultation for cholecystitis.  The patient noticed the acute onset of right upper quadrant abdominal pain last night.  An ultrasound confirms a fundal gallstone with gallbladder wall thickening and a normal size common duct.  White count is minimally elevated at 12,000.  Liver functions are within normal limits.  His recent past history is significant for a stroke with left-sided weakness that occurred on 12/1/2018.  He underwent a coronary angiogram that same day without significant disease.  He was just released from [facility] and is returned home with outpatient physical therapy.  He can move his right arm somewhat.  He cannot walk with his left leg but he is able to begin moving it.  He can transfer into a wheelchair.  Dysphasia is improving.  Reports he can have water with his pills.  He can eat normally.     Past medical history:   No past medical history on file.    Pastsurgical history:  Past Surgical History:   Procedure Laterality Date     ANGIOGRAM  12/01/2018     APPENDECTOMY       HERNIA REPAIR, UMBILICAL         Current medications:  Current Outpatient Medications   Medication Sig Dispense Refill     ASPIRIN ADULT LOW STRENGTH PO Take by mouth daily       atorvastatin (LIPITOR) 40 MG tablet Take 80 mg by mouth       carvedilol (COREG) 25 MG tablet Take 25 mg by mouth 2 times daily (with meals)        clopidogrel (PLAVIX) 75 MG tablet Take 75 mg by mouth       hydrALAZINE (APRESOLINE) 50 MG tablet Take 50 mg by mouth 3 times daily        hydrochlorothiazide (HYDRODIURIL) 50 MG tablet Take 50 mg by mouth daily        Insulin Aspart (NOVOLOG SC) Inject 25 Units Subcutaneous 3 times daily (before meals)       insulin glargine (LANTUS SOLOSTAR PEN) 100 UNIT/ML pen Inject 28 Units Subcutaneous  2 times daily        isosorbide dinitrate (ISORDIL) 10 MG tablet Take 10 mg by mouth 3 times daily        metFORMIN (GLUCOPHAGE) 1000 MG tablet Take 1,000 mg by mouth 2 times daily (with meals)        VITAMIN D, CHOLECALCIFEROL, PO Take by mouth daily         Allergies:  Allergies   Allergen Reactions     Ace Inhibitors      ANGIOEDEMA     Amlodipine      Other reaction(s): Chest Tightness       Family history:  No family history on file.    Social history:  Social History     Socioeconomic History     Marital status:      Spouse name: Not on file     Number of children: Not on file     Years of education: Not on file     Highest education level: Not on file   Social Needs     Financial resource strain: Not on file     Food insecurity - worry: Not on file     Food insecurity - inability: Not on file     Transportation needs - medical: Not on file     Transportation needs - non-medical: Not on file   Occupational History     Not on file   Tobacco Use     Smoking status: Never Smoker     Smokeless tobacco: Never Used   Substance and Sexual Activity     Alcohol use: No     Drug use: No     Sexual activity: Not on file   Other Topics Concern     Not on file   Social History Narrative    Preload   01/13/2013       PROBLEM LIST:  Patient Active Problem List   Diagnosis     Acute embolic stroke (H)     Angioedema     BPH (benign prostatic hyperplasia)     Essential hypertension     Hematuria     Hyperlipidemia     Hypertensive emergency     Impaired mobility and activities of daily living     Oropharyngeal dysphagia     Uncontrolled type 2 diabetes mellitus without complication, without long-term current use of insulin (H)     Unspecified systolic (congestive) heart failure (H)     Weakness     Acute cholecystitis/cholelithiasis     Review of systems:  COMPLETE REVIEW OF SYSTEMS: Denies problems  Gastrointestinal: Some nausea and vomiting.  See HPI  Cardiovascular: Negative coronary angiogram 2018  Respiratory:  "Chronic shortness of breath  Genitourinary: Denies problems  Musculoskeletal: Left-sided weakness  Skin: Denies problems  Neurologic: Recent embolic stroke  Psychiatric: Denies problems  Endocrine: Diabetes mellitus.   Heme/Lymphatic: On Plavix and aspirin for 90 days from date of stroke  Vascular: Denies problems      Physical exam: /84   Pulse 84   Temp 97.2  F (36.2  C)   Resp 22   Ht 1.88 m (6' 2\")   SpO2 92%   BMI 34.67 kg/m        General: this is a pleasant male patient in no acute distress.  Patient is awake alert and oriented x3 .  He can move his left arm against gravity.  He can move his left leg slightly.   Abdominal: Right lower quadrant scar.  Umbilical scar.  Soft and nontender.  Mild discomfort to palpation right upper quadrant.    Assessment:   Acute cholecystitis/cholelithiasis.  Patient has significant current comorbidities.  If his acute episode can resolve that would be the safest for the patient if we can delay laparoscopic cholecystectomy until he is 3 months post stroke.  That way we can safely stop his antiplatelet agents.  If he worsens and needs emergent surgery he may need to be in a location with neurology.    Plan:    Cipro times 5 days  Follow-up with me in 1 month  Anticipate laparoscopic cholecystectomy in March when his Plavix can be held.  He understands the risks to include bleeding, infection, potential injury to bile duct or bowel, possible open procedure and wishes to proceed.  He was given literature on laparoscopic cholecystectomy.   Recommend follow-up with his primary in 1 week.      Rashi Goodrich MD FACS          "

## 2019-01-22 NOTE — ED AVS SNAPSHOT
"    [facility]: [phone]                                              INTERAGENCY TRANSFER FORM - LAB / IMAGING / EKG / EMG RESULTS   2019                   Hospital Admission Date: 2019  MOON VASQUEZ   : 1945  Sex: Male         Attending Provider:  Luis Haywood MD    Allergies:  Ace Inhibitors, Amlodipine    Infection:  None   Service:  EMERGENCY ME    Ht:  1.88 m (6' 2\")   Wt:  122.5 kg (270 lb)   Admission Wt:  --    BMI:  34.67 kg/m 2   BSA:  2.53 m 2            Patient PCP Information     Provider PCP Type    [facility]         Lab Results - 3 Days      Troponin I [[id number]] (Abnormal)  Resulted: 19 0911, Result status: Final result   Ordering provider:  Luis Haywood MD  19 08 Resulting lab:  [facility]    Specimen Information    Type Source Collected On   Blood   19 08          Components    Component Value Reference Range Flag Lab   Troponin I ES 0.064 0.000 - 0.034 ug/L H [facility]            Nt probnp inpatient (BNP) [[id number]]  Resulted: 19 0644, Result status: Final result   Ordering provider:  Daniele Magallanes MD  19 0572 Resulting lab:  [facility]    Specimen Information    Type Source Collected On   Blood   19 0540          Components    Component Value Reference Range Flag Lab   N-Terminal Pro BNP Inpatient 99 0 - 100 pg/mL   [facility]   Comment:            Reference range shown and results flagged as abnormal are suggested inpatient   cut points for confirming diagnosis if CHF in an acute setting. Establishing a   baseline value for each individual patient is useful for follow-up. An   inpatient or emergency department NT-proPBNP <300 pg/mL effectively rules out   acute CHF, with 99% negative predictive value.  The outpatient non-acute reference range for ruling out CHF is:   0-125 pg/mL (age 18 to less than 75)   0-450 pg/mL (age 75 yrs and " older)              Urine Microscopic [[id number]]  Resulted: 01/22/19 0622, Result status: Final result   Ordering provider:  Daniele Magallanes MD  01/22/19 0600 Resulting lab:  [facility]    Specimen Information    Type Source Collected On       01/22/19 0600          Components    Component Value Reference Range Flag Lab   WBC Urine 0 - 5 OTO5^0 - 5 /HPF   [facility]   RBC Urine O - 2 OTO2^O - 2 /HPF   [facility]   Squamous Epithelial /LPF Urine Few FEW^Few /LPF   [facility]            CBC with platelets differential [[id number]] (Abnormal)  Resulted: 01/22/19 0620, Result status: Final result   Ordering provider:  Daniele Magallanes MD  01/22/19 0537 Resulting lab:  [facility]    Specimen Information    Type Source Collected On   Blood   01/22/19 0540          Components    Component Value Reference Range Flag Lab   WBC 12.2 4.0 - 11.0 10e9/L H [facility]   RBC Count 5.16 4.4 - 5.9 10e12/L   [facility]   Hemoglobin 15.4 13.3 - 17.7 g/dL   [facility]   Hematocrit 43.8 40.0 - 53.0 %   [facility]   MCV 85 78 - 100 fl   [facility]   MCH 29.8 26.5 - 33.0 pg   [facility]   MCHC 35.2 31.5 - 36.5 g/dL   [facility]   RDW 13.1 10.0 - 15.0 %   [facility]   Platelet Count 288 150 - 450 10e9/L   [facility]   Diff Method Automated Method     [facility]            UA reflex to Microscopic and Culture [[id number]] (Abnormal)  Resulted: 01/22/19 0613, Result status: Final result   Ordering provider:  Daniele Magallanes MD  01/22/19 0538 Resulting lab:  [facility]    Specimen Information    Type Source Collected On   Midstream Urine Urine Midstream 01/22/19 0600          Components    Component Value Reference Range Flag Lab   Color Urine Yellow     [facility]   Appearance Urine Clear     [facility]   Glucose Urine 100 NEG^Negative mg/dL A [facility]   Bilirubin Urine Negative NEG^Negative   [facility]   Ketones Urine 15 NEG^Negative mg/dL A [facility]   Specific  Gravity Urine >1.030 1.000 - 1.030 H [facility]   Blood Urine Small NEG^Negative A [facility]   pH Urine 6.5 5.0 - 9.0 pH   [facility]   Protein Albumin Urine 100 NEG^Negative mg/dL A [facility]   Urobilinogen Urine 0.2 0.2 - 1.0 EU/dL   [facility]   Nitrite Urine Negative NEG^Negative   [facility]   Leukocyte Esterase Urine Negative NEG^Negative   [facility]   Source Midstream Urine     [facility]            Lactic acid [[id number]]  Resulted: 01/22/19 0609, Result status: Final result   Ordering provider:  Daniele Magallanes MD  01/22/19 0537 Resulting lab:  [facility]    Specimen Information    Type Source Collected On   Blood   01/22/19 0540          Components    Component Value Reference Range Flag Lab   Lactic Acid 0.7 0.5 - 2.2 mmol/L   [facility]            Comprehensive metabolic panel [[id number]] (Abnormal)  Resulted: 01/22/19 0609, Result status: Final result   Ordering provider:  Daniele Magallanes MD  01/22/19 0537 Resulting lab:  [facility]    Specimen Information    Type Source Collected On   Blood   01/22/19 0540          Components    Component Value Reference Range Flag Lab   Sodium 140 134 - 144 mmol/L   [facility]   Potassium 3.5 3.5 - 5.1 mmol/L   [facility]   Chloride 103 98 - 107 mmol/L   [facility]   Carbon Dioxide 25 21 - 31 mmol/L   [facility]   Anion Gap 12 3 - 14 mmol/L   [facility]   Glucose 209 70 - 105 mg/dL H [facility]   Urea Nitrogen 20 7 - 25 mg/dL   [facility]   Creatinine 0.77 0.70 - 1.30 mg/dL   [facility]   GFR Estimate >90 >60 mL/min/{1.73_m2}   [facility]   GFR Estimate If Black >90 >60 mL/min/{1.73_m2}   [facility]   Calcium 9.7 8.6 - 10.3 mg/dL   [facility]   Bilirubin Total 0.9 0.3 - 1.0 mg/dL   [facility]   Albumin 4.3 3.5 - 5.7 g/dL   [facility]   Protein Total 6.5 6.4 - 8.9 g/dL   [facility]   Alkaline Phosphatase 70 34 - 104 U/L   [facility]   ALT 12 7 - 52 U/L   [facility]   AST 13 13 - 39 U/L   [facility]             Lipase [[id number]]  Resulted: 01/22/19 0609, Result status: Final result   Ordering provider:  Daniele Magallanes MD  01/22/19 0537 Resulting lab:  [facility]    Specimen Information    Type Source Collected On   Blood   01/22/19 0540          Components    Component Value Reference Range Flag Lab   Lipase 31 11 - 82 U/L   [facility]            Troponin I [[id number]] (Abnormal)  Resulted: 01/22/19 0603, Result status: Final result   Ordering provider:  Daniele Magallanes MD  01/22/19 0537 Resulting lab:  [facility]    Specimen Information    Type Source Collected On   Blood   01/22/19 0540          Components    Component Value Reference Range Flag Lab   Troponin I ES 0.055 0.000 - 0.034 ug/L H [facility]            INR [[id number]]  Resulted: 01/22/19 0554, Result status: Final result   Ordering provider:  Daniele Magallanes MD  01/22/19 0537 Resulting lab:  [facility]    Specimen Information    Type Source Collected On   Blood   01/22/19 0540          Components    Component Value Reference Range Flag Lab   INR 1.00 0 - 1.3   [facility]            Partial thromboplastin time [[id number]]  Resulted: 01/22/19 0554, Result status: Final result   Ordering provider:  Daniele Magallanes MD  01/22/19 0537 Resulting lab:  [facility]    Specimen Information    Type Source Collected On   Blood   01/22/19 0540          Components    Component Value Reference Range Flag Lab   PTT 39 29 - 50 sec   [facility]            Testing Performed By     Lab - Abbreviation Name Director Address Valid Date Range    1743 - [facility] Unknown [address]  [facility] [address] 08/07/15 0948 - Present            Unresulted Labs (24h ago, onward)    None         Imaging Results - 3 Days      US Abdomen Limited [[id number]]  Resulted: 01/22/19 0747, Result status: Final result   Ordering provider:  Sona  MD Daniele  01/22/19 0601 Resulted by:  DOLLY BATES   Performed:  01/22/19 0701 - 01/22/19 0726 Accession number:  [id number]   Resulting lab:  RADIOLOGY RESULTS   Narrative:  EXAM:    US Abdomen Limited, Right Upper Quadrant     EXAM DATE/TIME:    1/22/2019 7:01 AM     CLINICAL HISTORY:    73 years old, male; Pain; Abdominal pain; Additional info: Epigastric pain,   vomiting right upper quadrant tenderness. Rule out gallstones     TECHNIQUE:    Real-time ultrasound of the abdomen with image documentation. Examination was   focused on the right upper quadrant.     COMPARISON:    No relevant prior studies available.     FINDINGS:    Liver:  The liver measures 17 cm in sagittal dimensions.    Gallbladder:  Gallbladder wall edema with the wall measuring up to 8-9 mm.   Shadowing gallstone in the fundus.    Common bile duct:  The common bile duct measures 3.4 mm.    Pancreas: Visualized pancreas is unremarkable.    Right kidney:  The right kidney measures 11.5 cm in length.    Aorta:  Aorta is not well-seen due to overlying bowel gas.      Impression:  IMPRESSION:   Gallbladder wall edema with the wall measuring up to 8-9 mm. Shadowing   gallstone in the fundus.  Clinical correlation is advised to evaluate for   possible acute cholecystitis.    THIS DOCUMENT HAS BEEN ELECTRONICALLY SIGNED BY DOLLY BATES MD      Testing Performed By     Lab - Abbreviation Name Director Address Valid Date Range    104 - [facility] RADIOLOGY RESULTS Unknown Unknown 02/16/05 1553 - Present            Encounter-Level Documents:    There are no encounter-level documents.     Order-Level Documents:    There are no order-level documents.

## 2019-01-22 NOTE — ED PROVIDER NOTES
History     Chief Complaint   Patient presents with     Abdominal Pain     Vomiting     HPI  Lefty Quigley is a 73 year old male with a history of hypertension poorly controlled, type 2 diabetes on insulin, recently diagnosed stroke with left-sided hemiparesis and systolic heart failure with EF of 45% presenting to the emergency room via EMS on account of epigastric pain, vomiting and shortness of breath.  She reports that after supper last night he felt fine however at about 1 AM this morning he started having some epigastric discomforts which she rates as 10/10 for which she took charcoal twice but vomited twice and second vomitus contained ingested supper.  He denied bloody vomitus.  He denies any changes in his bowel habit and denies blood in stool..  He does reports pain in the epigastric region underneath his sternum which she rates now as 4/10.  He reports mild shortness of breath which she attributes to his recent stroke.  Denies fever or chills.  Reports previous history of intra-abdominal surgery [appendicitis and hernia repair].  Patient reports been participating in physical therapy at home 3 times a week.  He is able to transfer from the bed to a wheelchair.  Denies any previous history of acid reflux or epigastric pain or gastric ulcers.    Allergies:  Allergies   Allergen Reactions     Ace Inhibitors      ANGIOEDEMA     Amlodipine      Other reaction(s): Chest Tightness       Problem List:    Patient Active Problem List    Diagnosis Date Noted     Acute cholecystitis/cholelithiasis 01/22/2019     Priority: Medium     Unspecified systolic (congestive) heart failure (H) 01/03/2019     Priority: Medium     Weakness 01/03/2019     Priority: Medium     Impaired mobility and activities of daily living 12/03/2018     Priority: Medium     Oropharyngeal dysphagia 12/03/2018     Priority: Medium     Acute embolic stroke (H) 11/27/2018     Priority: Medium     Angioedema 11/26/2018     Priority: Medium      Hematuria 11/26/2018     Priority: Medium     Hypertensive emergency 11/26/2018     Priority: Medium     Uncontrolled type 2 diabetes mellitus without complication, without long-term current use of insulin (H) 11/26/2018     Priority: Medium     Essential hypertension 06/03/2010     Priority: Medium     Overview:   IMO Update       BPH (benign prostatic hyperplasia) 11/25/2009     Priority: Medium     Hyperlipidemia 11/06/2007     Priority: Medium     Overview:   IMO Update 10/11          Past Medical History:    No past medical history on file.    Past Surgical History:    Past Surgical History:   Procedure Laterality Date     ANGIOGRAM  12/01/2018     APPENDECTOMY       HERNIA REPAIR, UMBILICAL         Family History:    No family history on file.    Social History:  Marital Status:   [2]  Social History     Tobacco Use     Smoking status: Never Smoker     Smokeless tobacco: Never Used   Substance Use Topics     Alcohol use: No     Drug use: No        Medications:      ASPIRIN ADULT LOW STRENGTH PO   atorvastatin (LIPITOR) 40 MG tablet   carvedilol (COREG) 25 MG tablet   ciprofloxacin (CIPRO) 500 MG tablet   clopidogrel (PLAVIX) 75 MG tablet   hydrALAZINE (APRESOLINE) 50 MG tablet   hydrochlorothiazide (HYDRODIURIL) 50 MG tablet   HYDROcodone-acetaminophen (NORCO) 5-325 MG tablet   Insulin Aspart (NOVOLOG SC)   insulin glargine (LANTUS SOLOSTAR PEN) 100 UNIT/ML pen   isosorbide dinitrate (ISORDIL) 10 MG tablet   metFORMIN (GLUCOPHAGE) 1000 MG tablet   ondansetron (ZOFRAN ODT) 4 MG ODT tab   VITAMIN D, CHOLECALCIFEROL, PO         Review of Systems   Constitutional: Negative for chills and fever.   HENT: Positive for congestion and rhinorrhea.    Respiratory: Positive for shortness of breath. Negative for cough.    Cardiovascular: Negative for chest pain and leg swelling.   Gastrointestinal: Positive for nausea and vomiting. Negative for abdominal pain.   Genitourinary: Negative for dysuria.   Neurological:  "Positive for weakness. Negative for dizziness and light-headedness.       Physical Exam   BP: (!) 189/102(provider aware)  Pulse: 73  Heart Rate: 79  Temp: 97.2  F (36.2  C)  Resp: 18  Height: 188 cm (6' 2\")  SpO2: 95 %      Physical Exam   Constitutional: He is oriented to person, place, and time. He appears well-developed and well-nourished. No distress.   HENT:   Head: Normocephalic and atraumatic.   Eyes: EOM are normal. Pupils are equal, round, and reactive to light.   Cardiovascular: Normal rate, regular rhythm and normal heart sounds.   Pulmonary/Chest: Effort normal and breath sounds normal. No respiratory distress.   Abdominal: Bowel sounds are normal. He exhibits no distension. There is tenderness. There is no rebound and no guarding.   Obese abdomen.  Right upper quadrant tenderness without rebound or guarding.  Mild epigastric tenderness.   Musculoskeletal:   Left hemiparesis with's muscle strength of the upper and lower extremity 2+/5.   Neurological: He is alert and oriented to person, place, and time.       ED Course        Procedures               EKG Interpretation:      Interpreted by Daniele Magallanes  Time reviewed: 5:37   Symptoms at time of EKG: Epigastric Pain   Rhythm: normal sinus   Rate: 75  Axis: Normal  Ectopy: none  Conduction: normal  ST Segments/ T Waves: Non-specific ST-T wave changes  Q Waves: nonspecific  Comparison to prior: Unchanged    Clinical Impression: no acute changes      Critical Care time:  none    Results for orders placed or performed during the hospital encounter of 01/22/19 (from the past 24 hour(s))   CBC with platelets differential   Result Value Ref Range    WBC 12.2 (H) 4.0 - 11.0 10e9/L    RBC Count 5.16 4.4 - 5.9 10e12/L    Hemoglobin 15.4 13.3 - 17.7 g/dL    Hematocrit 43.8 40.0 - 53.0 %    MCV 85 78 - 100 fl    MCH 29.8 26.5 - 33.0 pg    MCHC 35.2 31.5 - 36.5 g/dL    RDW 13.1 10.0 - 15.0 %    Platelet Count 288 150 - 450 10e9/L    Diff Method Automated Method  "   INR   Result Value Ref Range    INR 1.00 0 - 1.3   Partial thromboplastin time   Result Value Ref Range    PTT 39 29 - 50 sec   Comprehensive metabolic panel   Result Value Ref Range    Sodium 140 134 - 144 mmol/L    Potassium 3.5 3.5 - 5.1 mmol/L    Chloride 103 98 - 107 mmol/L    Carbon Dioxide 25 21 - 31 mmol/L    Anion Gap 12 3 - 14 mmol/L    Glucose 209 (H) 70 - 105 mg/dL    Urea Nitrogen 20 7 - 25 mg/dL    Creatinine 0.77 0.70 - 1.30 mg/dL    GFR Estimate >90 >60 mL/min/[1.73_m2]    GFR Estimate If Black >90 >60 mL/min/[1.73_m2]    Calcium 9.7 8.6 - 10.3 mg/dL    Bilirubin Total 0.9 0.3 - 1.0 mg/dL    Albumin 4.3 3.5 - 5.7 g/dL    Protein Total 6.5 6.4 - 8.9 g/dL    Alkaline Phosphatase 70 34 - 104 U/L    ALT 12 7 - 52 U/L    AST 13 13 - 39 U/L   Lipase   Result Value Ref Range    Lipase 31 11 - 82 U/L   Lactic acid   Result Value Ref Range    Lactic Acid 0.7 0.5 - 2.2 mmol/L   Troponin I   Result Value Ref Range    Troponin I ES 0.055 (H) 0.000 - 0.034 ug/L   Nt probnp inpatient (BNP)   Result Value Ref Range    N-Terminal Pro BNP Inpatient 99 0 - 100 pg/mL   UA reflex to Microscopic and Culture   Result Value Ref Range    Color Urine Yellow     Appearance Urine Clear     Glucose Urine 100 (A) NEG^Negative mg/dL    Bilirubin Urine Negative NEG^Negative    Ketones Urine 15 (A) NEG^Negative mg/dL    Specific Gravity Urine >1.030 (H) 1.000 - 1.030    Blood Urine Small (A) NEG^Negative    pH Urine 6.5 5.0 - 9.0 pH    Protein Albumin Urine 100 (A) NEG^Negative mg/dL    Urobilinogen Urine 0.2 0.2 - 1.0 EU/dL    Nitrite Urine Negative NEG^Negative    Leukocyte Esterase Urine Negative NEG^Negative    Source Midstream Urine    Urine Microscopic   Result Value Ref Range    WBC Urine 0 - 5 OTO5^0 - 5 /HPF    RBC Urine O - 2 OTO2^O - 2 /HPF    Squamous Epithelial /LPF Urine Few FEW^Few /LPF   US Abdomen Limited    Narrative    EXAM:    US Abdomen Limited, Right Upper Quadrant     EXAM DATE/TIME:    1/22/2019 7:01 AM      CLINICAL HISTORY:    73 years old, male; Pain; Abdominal pain; Additional info: Epigastric pain,   vomiting right upper quadrant tenderness. Rule out gallstones     TECHNIQUE:    Real-time ultrasound of the abdomen with image documentation. Examination was   focused on the right upper quadrant.     COMPARISON:    No relevant prior studies available.     FINDINGS:    Liver:  The liver measures 17 cm in sagittal dimensions.    Gallbladder:  Gallbladder wall edema with the wall measuring up to 8-9 mm.   Shadowing gallstone in the fundus.    Common bile duct:  The common bile duct measures 3.4 mm.    Pancreas: Visualized pancreas is unremarkable.    Right kidney:  The right kidney measures 11.5 cm in length.    Aorta:  Aorta is not well-seen due to overlying bowel gas.       Impression    IMPRESSION:   Gallbladder wall edema with the wall measuring up to 8-9 mm. Shadowing   gallstone in the fundus.  Clinical correlation is advised to evaluate for   possible acute cholecystitis.    THIS DOCUMENT HAS BEEN ELECTRONICALLY SIGNED BY DOLLY BATES MD   Troponin I   Result Value Ref Range    Troponin I ES 0.064 (H) 0.000 - 0.034 ug/L       Medications   metFORMIN (GLUCOPHAGE) tablet 1,000 mg (0 mg Oral Hold 1/22/19 0829)   labetalol (NORMODYNE/TRANDATE) injection 10 mg (10 mg Intravenous Given 1/22/19 0739)   ondansetron (ZOFRAN) injection 4 mg (4 mg Intravenous Given 1/22/19 0826)   fentaNYL (PF) (SUBLIMAZE) injection 50 mcg (50 mcg Intravenous Given 1/22/19 0827)   hydrochlorothiazide (HYDRODIURIL) tablet 50 mg (50 mg Oral Given 1/22/19 0828)   hydrALAZINE (APRESOLINE) tablet 50 mg (50 mg Oral Given 1/22/19 0828)   carvedilol (COREG) tablet 25 mg (25 mg Oral Given 1/22/19 0828)   isosorbide dinitrate (ISORDIL) tablet 10 mg (10 mg Oral Given 1/22/19 0828)       7:11 AM  Patient presented with epigastric pain and was found to have right upper quadrant tenderness and some epigastric tenderness on palpation of the abdomen.   He denies any chest pain however troponin appears to be elevated but not as high in comparison with his previous troponin.  His blood pressure appears to be markedly elevated and elevated troponin could be a reflection of demand ischemia.  EKG without any acute ST segment changes in comparison to previous EKG as well.  Liver enzymes within normal limits.  Elevated white blood cell count setting of right upper quadrant tenderness is concerning for possible gallbladder disease.  Abdominal ultrasound ordered.  At this time it is shift change and patient's care has been signed out to the incoming physician Dr. Haywood.    Assessments & Plan (with Medical Decision Making)     I have reviewed the nursing notes.    I have reviewed the findings, diagnosis, plan and need for follow up with the patient.       Medication List      Started    ciprofloxacin 500 MG tablet  Commonly known as:  CIPRO  500 mg, Oral, 2 TIMES DAILY     HYDROcodone-acetaminophen 5-325 MG tablet  Commonly known as:  NORCO  1 tablet, Oral, EVERY 6 HOURS PRN     ondansetron 4 MG ODT tab  Commonly known as:  ZOFRAN ODT  4 mg, Oral, EVERY 8 HOURS PRN            Final diagnoses:   Cholecystitis       1/22/2019   [facility]  01/22/19 0716    Care of patient turned over myself at change of shift pending ultrasound of abdomen.  Patient does appear to have a cholecystitis which I believe could explain his symptoms that brought him in this morning.  Liver related tests are normal and this is reassuring.  White blood count is slightly elevated.  Patient is feeling somewhat better with above interventions.  I spoke with Dr. Goodrich, general surgery who came to the emergency department to consult on the patient.  He agrees with a diagnosis of cholecystitis, but does not feel it is emergent.  He would like to wait until the patient is off of his Plavix from his recent stroke.  We are going to send him home on a 5-day  course of Cipro, some pain medications and some nausea medications.  If this is not working he can always return to the emergency department for further evaluation.  Luis Haywood MD  1/22/2019, 10:59 AM       Luis Haywood MD  01/22/19 1052

## 2019-01-22 NOTE — ED NOTES
AVS reviewed in detail with wife. Questions answered. Given lab print outs and US CD as pt has follow up planned previously with [facility]. Also given [facility]ERT forms per wife's request for home care. Pt assisted by wife and standby [facility] to WC.

## 2019-02-26 DIAGNOSIS — G81.90 HEMIPLEGIA (H): Primary | ICD-10-CM

## 2019-02-26 DIAGNOSIS — I63.9 STROKE (H): ICD-10-CM

## 2019-02-26 DIAGNOSIS — M62.81 GENERALIZED MUSCLE WEAKNESS: ICD-10-CM

## 2019-02-26 DIAGNOSIS — G81.94 LEFT HEMIPLEGIA (H): Primary | ICD-10-CM

## 2019-03-01 ENCOUNTER — HOSPITAL ENCOUNTER (OUTPATIENT)
Dept: OCCUPATIONAL THERAPY | Facility: OTHER | Age: 74
Setting detail: THERAPIES SERIES
End: 2019-03-01
Attending: FAMILY MEDICINE
Payer: MEDICARE

## 2019-03-01 ENCOUNTER — HOSPITAL ENCOUNTER (OUTPATIENT)
Dept: PHYSICAL THERAPY | Facility: OTHER | Age: 74
Setting detail: THERAPIES SERIES
End: 2019-03-01
Attending: FAMILY MEDICINE
Payer: MEDICARE

## 2019-03-01 DIAGNOSIS — G81.94 LEFT HEMIPLEGIA (H): ICD-10-CM

## 2019-03-01 DIAGNOSIS — I63.9 STROKE (H): ICD-10-CM

## 2019-03-01 DIAGNOSIS — M62.81 GENERALIZED MUSCLE WEAKNESS: ICD-10-CM

## 2019-03-01 DIAGNOSIS — G81.90 HEMIPLEGIA (H): ICD-10-CM

## 2019-03-01 PROCEDURE — 97110 THERAPEUTIC EXERCISES: CPT | Mod: GO

## 2019-03-01 PROCEDURE — 97166 OT EVAL MOD COMPLEX 45 MIN: CPT | Mod: GO

## 2019-03-01 PROCEDURE — 97161 PT EVAL LOW COMPLEX 20 MIN: CPT | Mod: GP

## 2019-03-01 PROCEDURE — 97110 THERAPEUTIC EXERCISES: CPT | Mod: GP

## 2019-03-02 NOTE — PROGRESS NOTES
[facility]        OUTPATIENT PHYSICAL THERAPY FUNCTIONAL EVALUATION  PLAN OF TREATMENT FOR OUTPATIENT REHABILITATION  (COMPLETE FOR INITIAL CLAIMS ONLY)  Patient's Last Name, First Name, M.I.  YOB: 1945  Lefty Quigley     Provider's Name   [facility]   Medical Record No.  [id number]     Start of Care Date:  03/01/19   Onset Date:  11/26/18   Type:     _X__PT   ____OT  ____SLP Medical Diagnosis:  Left hemiplegia (H) G81.94,  Generalized muscle weakness M62.81      PT Diagnosis:  Impaired mobility, decreased strength and endurance, impaired balance and gait, s/p CVA Visits from SOC:  1                              __________________________________________________________________________________  Plan of Treatment/Functional Goals:  balance training, bed mobility training, gait training, joint mobilization, neuromuscular re-education, strengthening, ROM, stretching, transfer training, manual therapy     Electrical Stimulation/Russion Stimulation, Cryotherapy, Thermotherapy: Hydrocollator Packs     GOALS  HEP  Patient will demonstrate compliance and independence in his HEP in order to improve his ability to self manage his symptoms  03/29/19    Mobility  Patient will be able to transfer independently with his Julio César walker with no loss of balance in order to improve efficiency and safety with transfers  04/12/19    Ambulation  Patient will be able to ambulate for longer than 5 mintues with julio césar walker with no loss of balance in order to improve his mobility at home and the community  05/24/19    Mobility  Patient will be able to ascend/descend 1 flight of stairs with a railing and no loss of balance in order to improve his safety and efficiency with community mobility  05/24/19    Therapy Frequency:  other (see comments)(1-3 times per week)   Predicted Duration of  Therapy Intervention:  12 weeks    Rudy Israel, PT                                    I CERTIFY THE NEED FOR THESE SERVICES FURNISHED UNDER        THIS PLAN OF TREATMENT AND WHILE UNDER MY CARE .             Physician Signature               Date    X_____________________________________________________                      Certification Date From:  03/01/19   Certification Date To:  05/24/19    Referring Provider:  Dr. Mundo Menendez    Initial Assessment  See Epic Evaluation- Start of Care Date: 03/01/19

## 2019-03-02 NOTE — PROGRESS NOTES
03/01/19 1500   Quick Adds   Quick Adds Certification   Type of Visit Initial OP PT Evaluation       Present No   General Information   Start of Care Date 03/01/19   Referring Physician Dr. Mundo Menendez   Orders Evaluate and Treat as Indicated   Additional Orders eval and treat, stroke and hemiplegia, was home PT and now needs outpatient   Order Date 02/26/19   Medical Diagnosis Left hemiplegia (H) G81.94,  Generalized muscle weakness M62.81    Onset of illness/injury or Date of Surgery 11/26/18   Precautions/Limitations fall precautions   Surgical/Medical history reviewed Yes   Pertinent Visual History  Patient reports that his vision is normal. Denies any issues   Prior level of function comment Independent prior level of function   Current Community Support Family/friend caregiver   Patient role/Employment history Retired   Living environment House/townhome   Home/Community Accessibility Comments Notes that he had a incline built near the entrance of his home to make it wheel chair accessible   Current Assistive Devices Julio César Walker   General Information Comments Patient is a 73 year old male referred to physical therapy with s/p CVA. He notes that he had a stroke back in November 2018. He went into that emergency room here and then got shipped to [facility] by ambulance. He was then brought to [facility] facility where he spent 4-5 weeks. Initially, he reports that his left side was limp. He had a heart catheter placed. Since he returned home, he has noticed small improvements that occur randomly. He states he started out trying extremely hard to do things without much success and that got frustrating. He is still trying but realizes it will take time. Was walking with PT and when therapists were at home. He was recieving home care PT for about 6 weeks. Patient becomes tearful at different times during evaluation but remains encouraged and motivated to get better.    Fall Risk  Screen   Fall screen completed by PT   Have you fallen 2 or more times in the past year? No   Have you fallen and had an injury in the past year? No   Is patient a fall risk? Yes;Department fall risk interventions implemented   Fall screen comments Due to patient's recent CVA, he is considered a fall risk. Will implement fall risk interventions when appropriate   Pain   Patient currently in pain Denies   Cognitive Status Examination   Orientation orientation to person, place and time   Level of Consciousness alert   Follows Commands and Answers Questions 100% of the time   Personal Safety and Judgment intact   Memory intact   Observation   Observation Patient in wheelchair upon approach. Has strap around thigh to help assist with hip flexion on left LE. Left UE held close to body.    Integumentary   Integumentary No deficits were identified   Posture   Posture Forward head position;Protracted shoulders   Palpation   Palpation No significant findings   Strength   Manual Muscle Testing Quick Adds MMT: Hip;MMT: Knee;MMT: Ankle   MMT: Hip, Rehab Eval   Hip Flexion - Left Side (2-/5) poor minus, left   Hip ABduction - Left Side (3/5) fair, left   Hip ADduction - Left Side (3/5) fair, left   Hip Flexion - Right Side (5/5) normal,right   Hip ABduction - Right Side (5/5) normal,right   Hip ADduction - Right Side (5/5) normal,right   MMT: Knee, Rehab Eval   Knee Flexion - Left Side (4/5) good, left   Knee Extension - Left Side (4/5) good, left   Knee Flexion - Right Side (5/5) normal,right   Knee Extension - Right Side (5/5) normal,right   MMT: Ankle, Rehab Eval   Ankle Dorsiflexion - Left Side (3-/5) fair minus, left   Ankle Dorsiflexion - Right Side (5/5) normal,right   Bed Mobility   Bed Mobility Bed mobility skill: Supine to sit   Bed Mobility Comments Patient requires min assist x1 to transition from supine<>sit. Able to roll and scoot in bed independent   Bed Mobility Skill: Supine to Sit   Level of Mildred:  Supine/Sit minimum assist (75% patients effort)   Physical Assist/Nonphysical Assist: Supine/Sit 1 person assist   Transfer Skills   Transfer Transfer Skill: Bed to Chair/Chair to Bed   Transfer Skill: Bed/Chair   Level of [place]: Bed/Chair stand-by assist   Physical/Nonphysical Assist: Bed/Chair supervision   Weighbearing Restrictions: Bed/Chair full weight-bearing   Assistive Device: Bed/Chair amor walker   Locomotion   Wheel Chair Mobility Comments Can roll and steer WC independently for shorter distances   Gait   Gait Gait Skill;Gait Analysis   Gait Skills   Level of [place]: Gait contact guard   Physical Assist/Nonphysical Assist: Gait 1 person + 1 person to manage equipment   Weight-Bearing Restrictions: Gait full weight-bearing   Assistive Device for Transfer: Gait maor walker   Gait Distance 75 feet   Gait Analysis   Gait Pattern Used swing-to gait   Gait Deviations Noted decreased devan;decreased step length;decreased toe-to-floor clearance   Impairments Contributing to Gait Deviations impaired balance;decreased ROM;impaired motor control;impaired coordination;decreased strength   Balance   Balance Comments Able to stay upright with perturbations   Sensory Examination   Sensory Perception Comments Intact to light touch   Coordination   Coordination Comments Difficulty with alternating DF and PF. Also had mild difficulty with heel to shin due to lack of strength   Modality Interventions   Planned Modality Interventions Electrical Stimulation/Russion Stimulation;Cryotherapy;Thermotherapy: Hydrocollator Packs   Planned Therapy Interventions   Planned Therapy Interventions balance training;bed mobility training;gait training;joint mobilization;neuromuscular re-education;strengthening;ROM;stretching;transfer training;manual therapy   Clinical Impression   Criteria for Skilled Therapeutic Interventions Met yes, treatment indicated   PT Diagnosis Impaired mobility, decreased strength and endurance,  impaired balance and gait, s/p CVA   Influenced by the following impairments Left sided weakness, stiffness   Functional limitations due to impairments Ambulation, transfers, stairs   Clinical Presentation Evolving/Changing   Clinical Presentation Rationale Recent diagnosis of CVA   Clinical Decision Making (Complexity) Low complexity   Therapy Frequency other (see comments)  (1-3 times per week)   Predicted Duration of Therapy Intervention (days/wks) 12 weeks   Risk & Benefits of therapy have been explained Yes   Patient, Family & other staff in agreement with plan of care Yes   Clinical Impression Comments Patient is a 73 year old male referred to physical therapy following a CVA at the end of November. Patient is demonstrating impaired mobility and strength with his Left Upper and lower extremities. He is also seeing OT for UE. Patient has weakness most pronounced in his hip (flexion) and ankle (DF/PF). Patient is motivated to get better even though he has gotten frustrated by lack of progress recently. Able to ambulate with julio césar walker and wheel chair behind him. he will benefit from skilled PT services in order to reduce his pain and improve his mobility, strength, gait, and balance   Education Assessment   Barriers to Learning No barriers   GOALS   PT Eval Goals 1;2;3;4   Goal 1   Goal Identifier HEP   Goal Description Patient will demonstrate compliance and independence in his HEP in order to improve his ability to self manage his symptoms   Target Date 03/29/19   Goal 2   Goal Identifier Mobility   Goal Description Patient will be able to transfer independently with his Julio César walker with no loss of balance in order to improve efficiency and safety with transfers   Target Date 04/12/19   Goal 3   Goal Identifier Ambulation   Goal Description Patient will be able to ambulate for longer than 5 mintues with julio césar walker with no loss of balance in order to improve his mobility at home and the community   Target  Date 05/24/19   Goal 4   Goal Identifier Mobility   Goal Description Patient will be able to ascend/descend 1 flight of stairs with a railing and no loss of balance in order to improve his safety and efficiency with community mobility   Target Date 05/24/19   Total Evaluation Time   PT Lucy, Low Complexity Minutes (75410) 45   Therapy Certification   Certification date from 03/01/19   Certification date to 05/24/19   Medical Diagnosis Left hemiplegia (H) G81.94,  Generalized muscle weakness M62.81

## 2019-03-08 ENCOUNTER — HOSPITAL ENCOUNTER (OUTPATIENT)
Dept: OCCUPATIONAL THERAPY | Facility: OTHER | Age: 74
Setting detail: THERAPIES SERIES
End: 2019-03-08
Attending: FAMILY MEDICINE
Payer: MEDICARE

## 2019-03-08 ENCOUNTER — HOSPITAL ENCOUNTER (OUTPATIENT)
Dept: PHYSICAL THERAPY | Facility: OTHER | Age: 74
Setting detail: THERAPIES SERIES
End: 2019-03-08
Attending: FAMILY MEDICINE
Payer: MEDICARE

## 2019-03-08 PROCEDURE — 97110 THERAPEUTIC EXERCISES: CPT | Mod: GP

## 2019-03-08 PROCEDURE — 97010 HOT OR COLD PACKS THERAPY: CPT | Mod: GO

## 2019-03-08 PROCEDURE — 97110 THERAPEUTIC EXERCISES: CPT | Mod: GO

## 2019-03-08 PROCEDURE — 97140 MANUAL THERAPY 1/> REGIONS: CPT | Mod: GO

## 2019-03-08 PROCEDURE — 97116 GAIT TRAINING THERAPY: CPT | Mod: GP

## 2019-03-11 ENCOUNTER — HOSPITAL ENCOUNTER (OUTPATIENT)
Dept: OCCUPATIONAL THERAPY | Facility: OTHER | Age: 74
Setting detail: THERAPIES SERIES
End: 2019-03-11
Attending: FAMILY MEDICINE
Payer: MEDICARE

## 2019-03-11 PROCEDURE — 97010 HOT OR COLD PACKS THERAPY: CPT | Mod: GO

## 2019-03-11 PROCEDURE — 97140 MANUAL THERAPY 1/> REGIONS: CPT | Mod: GO

## 2019-03-11 PROCEDURE — 97110 THERAPEUTIC EXERCISES: CPT | Mod: GO

## 2019-03-11 NOTE — PROGRESS NOTES
[facility]          OUTPATIENT OCCUPATIONAL THERAPY  EVALUATION  PLAN OF TREATMENT FOR OUTPATIENT REHABILITATION  (COMPLETE FOR INITIAL CLAIMS ONLY)  Patient's Last Name, First Name, M.I.  YOB: 1945  Lefty Quigley                        Provider's Name  [facility] Medical Record No.  [id number]                               Onset Date:     11/26/18   Start of Care Date:     03/01/19   Type:     ___PT   _X_OT   ___SLP Medical Diagnosis:      CVA                           OT Diagnosis:     decreased independence with ADL/IADL tasks  Visits from SOC:  1   _________________________________________________________________________________  Plan of Treatment/Functional Goals:  ADL training, IADL training, Coordination training, Manual therapy, Neuromuscular re-education, Orthotic fitting/training, ROM, Self care/Home management, Strengthening, Stretching  Electrical stimulation, Hot/cold packs, Paraffin bath, Ultrasound                 Goals  Goal Identifier: STG 1   Goal Description: Mahamed will have pain free L shoulder ROM to 130 degrees in order to assist with dressing tasks   Target Date: 05/06/19     Goal Identifier: STG 2   Goal Description: Patient will have improved  strength by 5# in order to assist with household chores  Target Date: 05/06/19     Goal Identifier: STG 3   Goal Description: Patient will have improved standing tolerance up to 15 minutes in order to assist with ADL tasks   Target Date: 05/06/19     Goal Identifier: LTG 1   Goal Description: Patient will report improved ability to drive from current rating of 0 to at least 5 as measured by the PSFS.   Target Date: 06/03/19     Goal Identifier: LTG 2   Goal Description: Patient will subjectively report improved ability to complete household chores from current rating of 3 to at least 7 as measured by  PSFS  Target Date: 06/03/19                                              Therapy Frequency: 2-3x week      Predicted Duration of Therapy Intervention (days/wks): 12 weeks   Phuong Trejo OT          I CERTIFY THE NEED FOR THESE SERVICES FURNISHED UNDER        THIS PLAN OF TREATMENT AND WHILE UNDER MY CARE .             Physician Signature               Date    X_____________________________________________________                       ,    Certification date from: 03/01/19, Certification date to: 05/26/19               Referring Physician: Dr. Mundo Menendez      Initial Assessment        See Epic Evaluation      Start Of Care Date: 03/01/19

## 2019-03-11 NOTE — ADDENDUM NOTE
Encounter addended by: Phoung Trejo, OT on: 3/11/2019 3:07 PM   Actions taken: Pend clinical note, Sign clinical note, Flowsheet accepted, Charge Capture section accepted, Document created, Document edited

## 2019-03-11 NOTE — PROGRESS NOTES
03/01/19 1300   Quick Adds   Quick Adds Certification   Type of Visit Outpatient Occupational Therapy Re-Evaluation   General Information   Start Of Care Date 03/01/19   Referring Physician Dr. Mundo Menendez    Orders Evaluate and treat as indicated   Orders Date 02/26/19   Medical Diagnosis CVA    Onset of Illness/Injury or Date of Surgery 11/26/18   Precautions/Limitations Fall precautions   Surgical/Medical History Reviewed Yes   Additional Occupational Profile Info/Pertinent History of Current Problem Mahamed is a 73 year old male who eports to outpatient OT following a CVA in November.  Mahamed reports that he was brought by ambulance to [facility] in [place].  He was transfered to the rehab floor ([facility]) on 12/3/18 and discharged on 1/8/19.  He then started [facility] care with [facility] 3 days a week with PT and OT.   Mahamed is now ready for outpatient rehab.  Mahamed reports that he has been working hard with his exercises to become as independent as possible.   Mahamed is retired, however he would love to be able to return to driving, working on projects in the garage and completing the outside and heavy household chores.    Role/Living Environment   Current Community Support Therapy services;Family/friend caregiver   Patient role/Employment history Retired   Current Living Environment House   Prior Level - Transfers Independent   Prior Level - Ambulation Independent   Prior Level - ADLS Independent   Prior Responsibilities - IADL Meal Preparation;Housekeeping;Laundry;Driving;Finances;Yardwork;Shopping   Current Assistive Devices - Mobility Julio César walker   Abuse Screen (yes response referral indicated)   Feels Unsafe at Home or Work/School no   Feels Threatened by Someone no   Does Anyone Try to Keep You From Having Contact with Others or Doing Things Outside Your Home? no   Physical Signs of Abuse Present no   Cognitive Status Examination   Cognitive Comment No concerns    Range of Motion (ROM)   ROM Quick Adds Shoulder,  Left;Elbow/Forearm, Left;Wrist, Left   Left Shoulder Flexion ROM   Left Shoulder Flexion AROM - degrees 42   Left Shoulder Flexion PROM - degrees 88   Left Shoulder ABduction ROM   Left Shoulder ABduction AROM - degrees 52   Left Shoulder ABduction PROM - degrees 80   Left Forearm Supination ROM   Left Forearm Supination AROM - degrees 45   Left Forearm Supination PROM - degrees 54   Left Wrist Flexion ROM   Left Wrist Flexion AROM - degrees 74   Left Wrist Extension ROM   Left Wrist Extension AROM - degrees 25   Hand Strength   Hand Dominance Right   Left Hand  (pounds) 8 pounds   Right Hand  (pounds) 94 pounds   Left Lateral Pinch (pounds) 6 pounds   Right Lateral Pinch (pounds) 22 pounds   Left Three Point Pinch (pounds) 5 pounds   Right Three Point Pinch (pounds) 20 pounds   Coordination   Coordination Comments PCE testing: Round Blocks: (# of blocks in 1 minute) Right: 58  Left: 21   Nuts & Botlts: (time to place and remove 10 nuts and bolts)  right: 1 minute 21 seconds  left: 4 minutes 16 seconds   Pieces: (# of pieces in 1 minute)  right: 21  left: 3    Functional Mobility   Wheelchair Patient arrives to evaluation in wheelchair, wife pushes    Activity Tolerance   Activity Tolerance Fair    Planned Therapy Interventions   Planned Therapy Interventions ADL training;IADL training;Coordination training;Manual therapy;Neuromuscular re-education;Orthotic fitting/training;ROM;Self care/Home management;Strengthening;Stretching   Planned Modalities Electrical stimulation;Hot/cold packs;Paraffin bath;Ultrasound   Adult OT Eval Goals   OT Eval Goals (Adult) 1;2;3;4;5   OT Goal 1   Goal Identifier STG 1    Goal Description Mahamed will have pain free L shoulder ROM to 130 degrees in order to assist with dressing tasks    Target Date 05/06/19   OT Goal 2   Goal Identifier STG 2    Goal Description Patient will have improved  strength by 5# in order to assist with household chores   Target Date 05/06/19   OT Goal  3   Goal Identifier STG 3    Goal Description Patient will have improved standing tolerance up to 15 minutes in order to assist with ADL tasks    Target Date 05/06/19   OT Goal 4   Goal Identifier LTG 1    Goal Description Patient will report improved ability to drive from current rating of 0 to at least 5 as measured by the PSFS.    Target Date 06/03/19   OT Goal 5   Goal Identifier LTG 2    Goal Description Patient will subjectively report improved ability to complete household chores from current rating of 3 to at least 7 as measured by PSFS   Target Date 06/03/19   Clinical Impression   Criteria for Skilled Therapeutic Interventions Met Yes, treatment indicated   OT Diagnosis decreased independence with ADL/IADL tasks    Influenced by the following impairments weakness, decreased ROM, decreased coordination    Assessment of Occupational Performance 3-5 Performance Deficits   Identified Performance Deficits dressing, home mangement, functional mobiity   Clinical Decision Making (Complexity) Moderate complexity   Therapy Frequency 2-3x week    Predicted Duration of Therapy Intervention (days/wks) 12 weeks    Risks and Benefits of Treatment have been explained. Yes   Patient, Family & other staff in agreement with plan of care Yes   Clinical Impression Comments Patient demonstrated knowledge and acceptance to POC.  He is an active participant in the therapeutic process.    Education Assessment   Barriers To Learning No Barriers   Preferred Learning Style Demonstration;Listening   Therapy Certification   Certification date from 03/01/19   Certification date to 05/26/19   Certification I certify the need for these services furnished under this plan of treatment and while under my care.  (Physician co-signature of this document indicates review and certification of the therapy plan)   Total Evaluation Time   OT Lucy Moderate Complexity Minutes (16621) 30

## 2019-03-13 ENCOUNTER — HOSPITAL ENCOUNTER (OUTPATIENT)
Dept: OCCUPATIONAL THERAPY | Facility: OTHER | Age: 74
Setting detail: THERAPIES SERIES
End: 2019-03-13
Attending: FAMILY MEDICINE
Payer: MEDICARE

## 2019-03-13 ENCOUNTER — HOSPITAL ENCOUNTER (OUTPATIENT)
Dept: PHYSICAL THERAPY | Facility: OTHER | Age: 74
Setting detail: THERAPIES SERIES
End: 2019-03-13
Attending: FAMILY MEDICINE
Payer: MEDICARE

## 2019-03-13 PROCEDURE — 97110 THERAPEUTIC EXERCISES: CPT | Mod: GP

## 2019-03-13 PROCEDURE — 97140 MANUAL THERAPY 1/> REGIONS: CPT | Mod: GO

## 2019-03-13 PROCEDURE — 97110 THERAPEUTIC EXERCISES: CPT | Mod: GO

## 2019-03-13 PROCEDURE — 97010 HOT OR COLD PACKS THERAPY: CPT | Mod: GO

## 2019-03-13 PROCEDURE — 97116 GAIT TRAINING THERAPY: CPT | Mod: GP

## 2019-03-15 ENCOUNTER — HOSPITAL ENCOUNTER (OUTPATIENT)
Dept: OCCUPATIONAL THERAPY | Facility: OTHER | Age: 74
Setting detail: THERAPIES SERIES
End: 2019-03-15
Attending: FAMILY MEDICINE
Payer: MEDICARE

## 2019-03-15 ENCOUNTER — HOSPITAL ENCOUNTER (OUTPATIENT)
Dept: PHYSICAL THERAPY | Facility: OTHER | Age: 74
Setting detail: THERAPIES SERIES
End: 2019-03-15
Attending: FAMILY MEDICINE
Payer: MEDICARE

## 2019-03-15 PROCEDURE — 97110 THERAPEUTIC EXERCISES: CPT | Mod: GO

## 2019-03-15 PROCEDURE — 97140 MANUAL THERAPY 1/> REGIONS: CPT | Mod: GO

## 2019-03-15 PROCEDURE — 97116 GAIT TRAINING THERAPY: CPT | Mod: GP

## 2019-03-15 PROCEDURE — 97110 THERAPEUTIC EXERCISES: CPT | Mod: GP

## 2019-03-15 PROCEDURE — 97010 HOT OR COLD PACKS THERAPY: CPT | Mod: GO

## 2019-03-18 ENCOUNTER — HOSPITAL ENCOUNTER (OUTPATIENT)
Dept: OCCUPATIONAL THERAPY | Facility: OTHER | Age: 74
Setting detail: THERAPIES SERIES
End: 2019-03-18
Attending: FAMILY MEDICINE
Payer: MEDICARE

## 2019-03-18 ENCOUNTER — HOSPITAL ENCOUNTER (OUTPATIENT)
Dept: PHYSICAL THERAPY | Facility: OTHER | Age: 74
Setting detail: THERAPIES SERIES
End: 2019-03-18
Attending: FAMILY MEDICINE
Payer: MEDICARE

## 2019-03-18 PROCEDURE — 97116 GAIT TRAINING THERAPY: CPT | Mod: GP

## 2019-03-18 PROCEDURE — 97140 MANUAL THERAPY 1/> REGIONS: CPT | Mod: GO

## 2019-03-18 PROCEDURE — 97110 THERAPEUTIC EXERCISES: CPT | Mod: GO

## 2019-03-18 PROCEDURE — 97110 THERAPEUTIC EXERCISES: CPT | Mod: GP

## 2019-03-20 ENCOUNTER — HOSPITAL ENCOUNTER (OUTPATIENT)
Dept: PHYSICAL THERAPY | Facility: OTHER | Age: 74
Setting detail: THERAPIES SERIES
End: 2019-03-20
Attending: FAMILY MEDICINE
Payer: MEDICARE

## 2019-03-20 PROCEDURE — 97110 THERAPEUTIC EXERCISES: CPT | Mod: GP

## 2019-03-20 PROCEDURE — 97116 GAIT TRAINING THERAPY: CPT | Mod: GP

## 2019-04-05 NOTE — PROGRESS NOTES
Outpatient Physical Therapy Discharge Note     Patient: Lefty Quigley  : 1945    Beginning/End Dates of Reporting Period:  3/01/2019 to 2019    Referring Provider: Dr. Mundo Menendez     Therapy Diagnosis: Impaired mobility, decreased strength and endurance, impaired balance and gait, s/p CVA     Client Self Report: Patient notes that he has not had any falls since last visit. He doesn't have any pain from the previous fall. Notes that since the weekend, he has been able to breath, cough, and clear his throat better. Notes it is probably the best it's been since the stroke.     This is subjective information from last attended visit.     Objective Measurements:  Objective Measure: Subjective Pain Rating  Details: Denies today    Goals:  Goal Identifier HEP   Goal Description Patient will demonstrate compliance and independence in his HEP in order to improve his ability to self manage his symptoms   Target Date 19   Date Met      Progress:     Goal Identifier Mobility   Goal Description Patient will be able to transfer independently with his Julio César walker with no loss of balance in order to improve efficiency and safety with transfers   Target Date 19   Date Met      Progress:     Goal Identifier Ambulation   Goal Description Patient will be able to ambulate for longer than 5 mintues with julio césar walker with no loss of balance in order to improve his mobility at home and the community   Target Date 19   Date Met      Progress:     Goal Identifier Mobility   Goal Description Patient will be able to ascend/descend 1 flight of stairs with a railing and no loss of balance in order to improve his safety and efficiency with community mobility   Target Date 19   Date Met      Progress:     Progress Toward Goals:    Unable to assess progress towards goals as discharge is unplanned.     Plan:  Discharge from therapy.    Discharge:    Reason for Discharge: Medicare G-code: Patient did not attend a  final scheduled session prior to discharge. Unable to determine discharge functional status. Patient is attending aquatic therapy and cancelled the remaining therapy visits.     Equipment Issued: none    Discharge Plan: Follow up with physician as needed.

## 2019-04-05 NOTE — ADDENDUM NOTE
Encounter addended by: Rudy Israel, PT on: 4/5/2019 1:38 PM   Actions taken: Sign clinical note, Episode resolved

## 2019-08-01 LAB — EJECTION FRACTION: 54.7 %

## 2019-09-13 NOTE — PROGRESS NOTES
"Outpatient Occupational Therapy Discharge Note     Patient: Lefty Quigley  : 1945    Beginning/End Dates of Reporting Period:  3/1/2019 to 3/18/2019    Referring Provider: Dr. Mundo Menendez    Therapy Diagnosis: decreased independence with ADL/IADL tasks     Client Self Report: \"I took  tumble this weekend, on Saturday morning I was trasnferring the wrogn way and used my left arm a little to catch myself so it is a little sore still today.\"      Objective Measurements:  None taken this date. Unexpected discharge.       Goals:     Goal Identifier STG 1    Goal Description Mahamed will have pain free L shoulder ROM to 130 degrees in order to assist with dressing tasks    Target Date 19   Date Met      Progress:     Goal Identifier STG 2    Goal Description Patient will have improved  strength by 5# in order to assist with household chores    Target Date 19   Date Met      Progress:     Goal Identifier STG 3    Goal Description Patient will have improved standing tolerance up to 15 minutes in order to assist with ADL tasks    Target Date 19   Date Met      Progress:     Goal Identifier LTG 1    Goal Description Patient will report improved ability to drive from current rating of 0 to at least 5 as measured by the PSFS.   Target Date 19   Date Met      Progress:     Goal Identifier LTG 2    Goal Description Patient will subjectively report improved ability to complete household chores from current rating of 3 to at least 7 as measured by PSFS    Target Date 19   Date Met      Progress:     Progress Toward Goals:   Patient was slowly progressing towards functional goals. Patient decided to switch to pool therapy for the time being.     Plan:  Discharge from therapy.    Discharge:    Reason for Discharge: Patient has failed to schedule further appointments.      Discharge Plan: Other services: pool therapy.  "

## 2019-09-13 NOTE — ADDENDUM NOTE
Encounter addended by: Phuong Trejo, OT on: 9/13/2019 3:22 PM   Actions taken: Pend clinical note, Flowsheet accepted, Sign clinical note, Episode resolved

## 2019-10-10 DIAGNOSIS — E11.9 DIABETES MELLITUS (H): Primary | ICD-10-CM

## 2019-10-18 DIAGNOSIS — E11.9 DIABETES MELLITUS (H): ICD-10-CM

## 2019-10-18 LAB
ALBUMIN SERPL-MCNC: 3.7 G/DL (ref 3.4–5)
ALBUMIN UR-MCNC: 10 MG/DL
ALP SERPL-CCNC: 67 U/L (ref 40–150)
ALT SERPL W P-5'-P-CCNC: 15 U/L (ref 0–70)
ANION GAP SERPL CALCULATED.3IONS-SCNC: 3 MMOL/L (ref 3–14)
APPEARANCE UR: CLEAR
AST SERPL W P-5'-P-CCNC: 10 U/L (ref 0–45)
BACTERIA #/AREA URNS HPF: ABNORMAL /HPF
BILIRUB SERPL-MCNC: 0.8 MG/DL (ref 0.2–1.3)
BILIRUB UR QL STRIP: NEGATIVE
BUN SERPL-MCNC: 21 MG/DL (ref 7–30)
CALCIUM SERPL-MCNC: 9.1 MG/DL (ref 8.5–10.1)
CHLORIDE SERPL-SCNC: 107 MMOL/L (ref 94–109)
CO2 SERPL-SCNC: 30 MMOL/L (ref 20–32)
COLOR UR AUTO: ABNORMAL
CREAT SERPL-MCNC: 0.89 MG/DL (ref 0.66–1.25)
CREAT UR-MCNC: 105 MG/DL
GFR SERPL CREATININE-BSD FRML MDRD: 85 ML/MIN/{1.73_M2}
GLUCOSE SERPL-MCNC: 135 MG/DL (ref 70–99)
GLUCOSE UR STRIP-MCNC: NEGATIVE MG/DL
HGB UR QL STRIP: NEGATIVE
KETONES UR STRIP-MCNC: NEGATIVE MG/DL
LEUKOCYTE ESTERASE UR QL STRIP: NEGATIVE
MICROALBUMIN UR-MCNC: 248 MG/L
MICROALBUMIN/CREAT UR: 236.19 MG/G CR (ref 0–17)
MUCOUS THREADS #/AREA URNS LPF: PRESENT /LPF
NITRATE UR QL: NEGATIVE
PH UR STRIP: 6 PH (ref 4.7–8)
POTASSIUM SERPL-SCNC: 4.1 MMOL/L (ref 3.4–5.3)
PROT SERPL-MCNC: 6.6 G/DL (ref 6.8–8.8)
RBC #/AREA URNS AUTO: 1 /HPF (ref 0–2)
SODIUM SERPL-SCNC: 140 MMOL/L (ref 133–144)
SOURCE: ABNORMAL
SP GR UR STRIP: 1.02 (ref 1–1.03)
UROBILINOGEN UR STRIP-MCNC: NORMAL MG/DL (ref 0–2)
WBC #/AREA URNS AUTO: 0 /HPF (ref 0–5)

## 2019-10-18 PROCEDURE — 80053 COMPREHEN METABOLIC PANEL: CPT | Performed by: ORTHOPAEDIC SURGERY

## 2019-10-18 PROCEDURE — 81001 URINALYSIS AUTO W/SCOPE: CPT | Performed by: ORTHOPAEDIC SURGERY

## 2019-10-18 PROCEDURE — 36415 COLL VENOUS BLD VENIPUNCTURE: CPT | Performed by: ORTHOPAEDIC SURGERY

## 2019-10-18 PROCEDURE — 82043 UR ALBUMIN QUANTITATIVE: CPT | Performed by: ORTHOPAEDIC SURGERY

## 2020-08-22 ENCOUNTER — TRANSFERRED RECORDS (OUTPATIENT)
Dept: HEALTH INFORMATION MANAGEMENT | Facility: CLINIC | Age: 75
End: 2020-08-22

## 2021-07-25 ENCOUNTER — HOSPITAL ENCOUNTER (EMERGENCY)
Facility: OTHER | Age: 76
Discharge: HOME | End: 2021-07-25
Attending: EMERGENCY MEDICINE | Admitting: EMERGENCY MEDICINE
Payer: MEDICARE

## 2021-07-25 VITALS
TEMPERATURE: 98.8 F | HEART RATE: 78 BPM | BODY MASS INDEX: 37.97 KG/M2 | SYSTOLIC BLOOD PRESSURE: 182 MMHG | DIASTOLIC BLOOD PRESSURE: 116 MMHG | RESPIRATION RATE: 26 BRPM | OXYGEN SATURATION: 94 % | WEIGHT: 295.7 LBS

## 2021-07-25 DIAGNOSIS — R33.9 URINARY RETENTION: ICD-10-CM

## 2021-07-25 LAB
ALBUMIN UR-MCNC: 300 MG/DL
APPEARANCE UR: CLEAR
BILIRUB UR QL STRIP: NEGATIVE
COLOR UR AUTO: YELLOW
GLUCOSE UR STRIP-MCNC: NEGATIVE MG/DL
HGB UR QL STRIP: ABNORMAL
KETONES UR STRIP-MCNC: NEGATIVE MG/DL
LEUKOCYTE ESTERASE UR QL STRIP: NEGATIVE
MUCOUS THREADS #/AREA URNS LPF: PRESENT /LPF
NITRATE UR QL: NEGATIVE
PH UR STRIP: 7.5 [PH] (ref 5–9)
RBC URINE: 105 /HPF
SP GR UR STRIP: 1.01 (ref 1–1.03)
UROBILINOGEN UR STRIP-MCNC: NORMAL MG/DL
WBC URINE: 1 /HPF

## 2021-07-25 PROCEDURE — 81001 URINALYSIS AUTO W/SCOPE: CPT | Performed by: EMERGENCY MEDICINE

## 2021-07-25 PROCEDURE — 51798 US URINE CAPACITY MEASURE: CPT | Performed by: EMERGENCY MEDICINE

## 2021-07-25 PROCEDURE — 99283 EMERGENCY DEPT VISIT LOW MDM: CPT | Mod: 25 | Performed by: EMERGENCY MEDICINE

## 2021-07-25 PROCEDURE — 99283 EMERGENCY DEPT VISIT LOW MDM: CPT | Performed by: EMERGENCY MEDICINE

## 2021-07-25 ASSESSMENT — ENCOUNTER SYMPTOMS
FEVER: 0
SHORTNESS OF BREATH: 0
NAUSEA: 0
VOMITING: 0
LIGHT-HEADEDNESS: 0
AGITATION: 0
CHILLS: 0
CHEST TIGHTNESS: 0
ARTHRALGIAS: 0

## 2021-07-25 NOTE — ED PROVIDER NOTES
"  History     Chief Complaint   Patient presents with     Urinary Retention     HPI  Lefty Quigley is a 76 year old male who states he has been having urinary problems for some time.  Last night he felt like his bladder was \"bursting\" and he started leaking urine around it.  He says he does have enlarged prostate.  He is quite uncomfortable.  On arrival bladder scan was obtained which only showed 75 mL of urine.    Allergies:  Allergies   Allergen Reactions     Ace Inhibitors      ANGIOEDEMA     Amlodipine      Other reaction(s): Chest Tightness       Problem List:    Patient Active Problem List    Diagnosis Date Noted     Acute cholecystitis/cholelithiasis 01/22/2019     Priority: Medium     Unspecified systolic (congestive) heart failure (H) 01/03/2019     Priority: Medium     Weakness 01/03/2019     Priority: Medium     Impaired mobility and activities of daily living 12/03/2018     Priority: Medium     Oropharyngeal dysphagia 12/03/2018     Priority: Medium     Acute embolic stroke (H) 11/27/2018     Priority: Medium     Angioedema 11/26/2018     Priority: Medium     Hematuria 11/26/2018     Priority: Medium     Hypertensive emergency 11/26/2018     Priority: Medium     Uncontrolled type 2 diabetes mellitus without complication, without long-term current use of insulin 11/26/2018     Priority: Medium     IMO Regulatory Load OCT 2020       Essential hypertension 06/03/2010     Priority: Medium     Overview:   IMO Update       BPH (benign prostatic hyperplasia) 11/25/2009     Priority: Medium     Hyperlipidemia 11/06/2007     Priority: Medium     Overview:   IMO Update 10/11          Past Medical History:    History reviewed. No pertinent past medical history.    Past Surgical History:    Past Surgical History:   Procedure Laterality Date     ANGIOGRAM  12/01/2018     APPENDECTOMY       HERNIA REPAIR, UMBILICAL         Family History:    History reviewed. No pertinent family history.    Social History:  Marital " Status:   [2]  Social History     Tobacco Use     Smoking status: Never Smoker     Smokeless tobacco: Never Used   Substance Use Topics     Alcohol use: No     Drug use: No        Medications:    ASPIRIN ADULT LOW STRENGTH PO  carvedilol (COREG) 25 MG tablet  clopidogrel (PLAVIX) 75 MG tablet  hydrALAZINE (APRESOLINE) 50 MG tablet  hydrochlorothiazide (HYDRODIURIL) 50 MG tablet  Insulin Aspart (NOVOLOG SC)  insulin glargine (LANTUS SOLOSTAR PEN) 100 UNIT/ML pen  isosorbide dinitrate (ISORDIL) 10 MG tablet  metFORMIN (GLUCOPHAGE) 1000 MG tablet  VITAMIN D, CHOLECALCIFEROL, PO          Review of Systems   Constitutional: Negative for chills and fever.   HENT: Negative for congestion.    Eyes: Negative for visual disturbance.   Respiratory: Negative for chest tightness and shortness of breath.    Cardiovascular: Negative for chest pain.   Gastrointestinal: Negative for nausea and vomiting.   Genitourinary: Positive for decreased urine volume.   Musculoskeletal: Negative for arthralgias.   Skin: Negative for rash.   Neurological: Negative for light-headedness.   Psychiatric/Behavioral: Negative for agitation.       Physical Exam   BP: 116/84  Pulse: 99  Temp: 98.8  F (37.1  C)  Resp: 26  Weight: 134.1 kg (295 lb 11.2 oz)  SpO2: 96 %      Physical Exam  Vitals and nursing note reviewed.   Constitutional:       Appearance: Normal appearance.   HENT:      Head: Normocephalic and atraumatic.      Mouth/Throat:      Mouth: Mucous membranes are moist.   Eyes:      Conjunctiva/sclera: Conjunctivae normal.   Cardiovascular:      Rate and Rhythm: Normal rate.   Pulmonary:      Effort: Pulmonary effort is normal.   Skin:     General: Skin is warm and dry.   Neurological:      Mental Status: He is alert and oriented to person, place, and time.   Psychiatric:         Mood and Affect: Mood normal.         Behavior: Behavior normal.         ED Course        Procedures                Results for orders placed or performed  during the hospital encounter of 07/25/21 (from the past 24 hour(s))   UA with Microscopic reflex to Culture    Specimen: Urine, Clean Catch   Result Value Ref Range    Color Urine Yellow Colorless, Straw, Light Yellow, Yellow    Appearance Urine Clear Clear    Glucose Urine Negative Negative mg/dL    Bilirubin Urine Negative Negative    Ketones Urine Negative Negative mg/dL    Specific Gravity Urine 1.007 1.000 - 1.030    Blood Urine Moderate (A) Negative    pH Urine 7.5 5.0 - 9.0    Protein Albumin Urine 300  (A) Negative mg/dL    Urobilinogen Urine Normal Normal, 2.0 mg/dL    Nitrite Urine Negative Negative    Leukocyte Esterase Urine Negative Negative    Mucus Urine Present (A) None Seen /LPF    RBC Urine 105 (H) <=2 /HPF    WBC Urine 1 <=5 /HPF    Narrative    Urine Culture not indicated       Medications - No data to display    Assessments & Plan (with Medical Decision Making)     I have reviewed the nursing notes.    I have reviewed the findings, diagnosis, plan and need for follow up with the patient.  Patient had a Milner catheter placed, in spite of the bladder scan showing only 75 mL of urine, he had over 1 L return.  He is feeling markedly better.  Urinalysis shows no sign of bladder infection.  He will be discharged home with a catheter in place now.  He wishes to follow-up with his primary Dr. Menendez, and they have already have an appointment scheduled for Tuesday.  They wish to follow-up with [facility] urology.  Return here if worse.    New Prescriptions    No medications on file       Final diagnoses:   Urinary retention       7/25/2021   [facility]     Luis Haywood MD  07/25/21 0881

## 2021-07-25 NOTE — ED TRIAGE NOTES
Patient woke up around 0430 and was unable to urinate and reported increased pain. While transferring into [facility] he began to leak urine reports no relief. Highest bladder scan showed 75 mL.  
dependent (less than 25% patients effort)

## 2021-07-25 NOTE — ED NOTES
16 Fr. Coude mahajan catheter inserted, patient tolerated well, immediate urine output, patient reports relief of pain. Edouard Purvis RN on 7/25/2021 at 7:39 AM

## 2021-07-27 LAB
ALT SERPL-CCNC: 13 IU/L (ref 6–40)
AST SERPL-CCNC: 18 IU/L (ref 10–40)
CHOLESTEROL (EXTERNAL): 138 MG/DL (ref 114–200)
HBA1C MFR BLD: 7.3 % (ref 4–5.6)
HDLC SERPL-MCNC: 33 MG/DL (ref 40–60)
LDL CHOLESTEROL (EXTERNAL): 68 MG/DL
TRIGLYCERIDES (EXTERNAL): 186 MG/DL (ref 10–200)
TSH SERPL-ACNC: 3.18 UIU/ML (ref 0.4–3.99)

## 2021-08-01 LAB
ALBUMIN (URINE) MG/SPEC: 361.2 MG/DL
ALBUMIN/CREATININE RATIO: 1911 (ref 0–29)
CREATININE (URINE): 189 MG/DL

## 2021-08-06 LAB — EJECTION FRACTION: 55 %

## 2021-08-07 ENCOUNTER — TRANSFERRED RECORDS (OUTPATIENT)
Dept: HEALTH INFORMATION MANAGEMENT | Facility: CLINIC | Age: 76
End: 2021-08-07

## 2021-08-07 LAB
CREATININE (EXTERNAL): 1.22 MG/DL (ref 0.7–1.2)
GFR ESTIMATED (EXTERNAL): 58 ML/MIN/1.73M2
GLUCOSE (EXTERNAL): 372 MG/DL (ref 70–99)
INR (EXTERNAL): 1 (ref 0.9–1.1)
POTASSIUM (EXTERNAL): 4.5 MEQ/L (ref 3.4–5.1)

## 2021-08-08 ENCOUNTER — APPOINTMENT (OUTPATIENT)
Dept: CARDIOLOGY | Facility: CLINIC | Age: 76
DRG: 281 | End: 2021-08-08
Attending: PHYSICIAN ASSISTANT
Payer: MEDICARE

## 2021-08-08 ENCOUNTER — HOSPITAL ENCOUNTER (INPATIENT)
Facility: CLINIC | Age: 76
LOS: 2 days | Discharge: HOME | DRG: 281 | End: 2021-08-10
Attending: STUDENT IN AN ORGANIZED HEALTH CARE EDUCATION/TRAINING PROGRAM | Admitting: INTERNAL MEDICINE
Payer: MEDICARE

## 2021-08-08 DIAGNOSIS — T83.511D URINARY TRACT INFECTION ASSOCIATED WITH CATHETERIZATION OF URINARY TRACT, UNSPECIFIED INDWELLING URINARY CATHETER TYPE, SUBSEQUENT ENCOUNTER: ICD-10-CM

## 2021-08-08 DIAGNOSIS — N39.0 URINARY TRACT INFECTION ASSOCIATED WITH CATHETERIZATION OF URINARY TRACT, UNSPECIFIED INDWELLING URINARY CATHETER TYPE, SUBSEQUENT ENCOUNTER: ICD-10-CM

## 2021-08-08 DIAGNOSIS — I16.1 HYPERTENSIVE EMERGENCY: ICD-10-CM

## 2021-08-08 DIAGNOSIS — I25.10 CORONARY ARTERY DISEASE INVOLVING NATIVE CORONARY ARTERY OF NATIVE HEART WITHOUT ANGINA PECTORIS: ICD-10-CM

## 2021-08-08 DIAGNOSIS — I21.4 NSTEMI (NON-ST ELEVATED MYOCARDIAL INFARCTION) (H): Primary | ICD-10-CM

## 2021-08-08 DIAGNOSIS — Z98.890 S/P CORONARY ANGIOGRAM: ICD-10-CM

## 2021-08-08 LAB
ALBUMIN UR-MCNC: 100 MG/DL
ANION GAP SERPL CALCULATED.3IONS-SCNC: 5 MMOL/L (ref 3–14)
APPEARANCE UR: CLEAR
BILIRUB UR QL STRIP: NEGATIVE
BUN SERPL-MCNC: 13 MG/DL (ref 7–30)
CALCIUM SERPL-MCNC: 8.3 MG/DL (ref 8.5–10.1)
CHLORIDE BLD-SCNC: 105 MMOL/L (ref 94–109)
CO2 SERPL-SCNC: 28 MMOL/L (ref 20–32)
COLOR UR AUTO: ABNORMAL
CREAT SERPL-MCNC: 1.06 MG/DL (ref 0.66–1.25)
ERYTHROCYTE [DISTWIDTH] IN BLOOD BY AUTOMATED COUNT: 13.2 % (ref 10–15)
GFR SERPL CREATININE-BSD FRML MDRD: 68 ML/MIN/1.73M2
GLUCOSE BLD-MCNC: 253 MG/DL (ref 70–99)
GLUCOSE BLDC GLUCOMTR-MCNC: 292 MG/DL (ref 70–99)
GLUCOSE BLDC GLUCOMTR-MCNC: 309 MG/DL (ref 70–99)
GLUCOSE BLDC GLUCOMTR-MCNC: 345 MG/DL (ref 70–99)
GLUCOSE UR STRIP-MCNC: 1000 MG/DL
HBA1C MFR BLD: 6.9 % (ref 0–5.6)
HCT VFR BLD AUTO: 39.2 % (ref 40–53)
HGB BLD-MCNC: 13.3 G/DL (ref 13.3–17.7)
HGB UR QL STRIP: ABNORMAL
KETONES UR STRIP-MCNC: NEGATIVE MG/DL
LEUKOCYTE ESTERASE UR QL STRIP: ABNORMAL
LVEF ECHO: NORMAL
MCH RBC QN AUTO: 29.8 PG (ref 26.5–33)
MCHC RBC AUTO-ENTMCNC: 33.9 G/DL (ref 31.5–36.5)
MCV RBC AUTO: 88 FL (ref 78–100)
NITRATE UR QL: NEGATIVE
PH UR STRIP: 7.5 [PH] (ref 5–7)
PLATELET # BLD AUTO: 262 10E3/UL (ref 150–450)
POTASSIUM BLD-SCNC: 4.4 MMOL/L (ref 3.4–5.3)
RBC # BLD AUTO: 4.46 10E6/UL (ref 4.4–5.9)
RBC URINE: >182 /HPF
SODIUM SERPL-SCNC: 138 MMOL/L (ref 133–144)
SP GR UR STRIP: 1.01 (ref 1–1.03)
TROPONIN I SERPL-MCNC: 0.18 UG/L (ref 0–0.04)
TROPONIN I SERPL-MCNC: 0.19 UG/L (ref 0–0.04)
TROPONIN I SERPL-MCNC: 0.21 UG/L (ref 0–0.04)
UFH PPP CHRO-ACNC: <0.1 IU/ML
UFH PPP CHRO-ACNC: <0.1 IU/ML
UROBILINOGEN UR STRIP-MCNC: NORMAL MG/DL
WBC # BLD AUTO: 10.3 10E3/UL (ref 4–11)
WBC URINE: 6 /HPF

## 2021-08-08 PROCEDURE — 250N000012 HC RX MED GY IP 250 OP 636 PS 637: Performed by: PHYSICIAN ASSISTANT

## 2021-08-08 PROCEDURE — 80048 BASIC METABOLIC PNL TOTAL CA: CPT | Performed by: PHYSICIAN ASSISTANT

## 2021-08-08 PROCEDURE — 84484 ASSAY OF TROPONIN QUANT: CPT | Performed by: PHYSICIAN ASSISTANT

## 2021-08-08 PROCEDURE — 85520 HEPARIN ASSAY: CPT | Performed by: STUDENT IN AN ORGANIZED HEALTH CARE EDUCATION/TRAINING PROGRAM

## 2021-08-08 PROCEDURE — 93325 DOPPLER ECHO COLOR FLOW MAPG: CPT | Mod: 26 | Performed by: STUDENT IN AN ORGANIZED HEALTH CARE EDUCATION/TRAINING PROGRAM

## 2021-08-08 PROCEDURE — 255N000002 HC RX 255 OP 636: Performed by: STUDENT IN AN ORGANIZED HEALTH CARE EDUCATION/TRAINING PROGRAM

## 2021-08-08 PROCEDURE — 83036 HEMOGLOBIN GLYCOSYLATED A1C: CPT | Performed by: PHYSICIAN ASSISTANT

## 2021-08-08 PROCEDURE — 36415 COLL VENOUS BLD VENIPUNCTURE: CPT | Performed by: STUDENT IN AN ORGANIZED HEALTH CARE EDUCATION/TRAINING PROGRAM

## 2021-08-08 PROCEDURE — 93010 ELECTROCARDIOGRAM REPORT: CPT | Performed by: INTERNAL MEDICINE

## 2021-08-08 PROCEDURE — 250N000013 HC RX MED GY IP 250 OP 250 PS 637: Performed by: PHYSICIAN ASSISTANT

## 2021-08-08 PROCEDURE — 250N000011 HC RX IP 250 OP 636: Performed by: PHYSICIAN ASSISTANT

## 2021-08-08 PROCEDURE — 214N000001 HC R&B CCU UMMC

## 2021-08-08 PROCEDURE — 93005 ELECTROCARDIOGRAM TRACING: CPT

## 2021-08-08 PROCEDURE — 93350 STRESS TTE ONLY: CPT | Mod: 26 | Performed by: STUDENT IN AN ORGANIZED HEALTH CARE EDUCATION/TRAINING PROGRAM

## 2021-08-08 PROCEDURE — 93016 CV STRESS TEST SUPVJ ONLY: CPT | Performed by: STUDENT IN AN ORGANIZED HEALTH CARE EDUCATION/TRAINING PROGRAM

## 2021-08-08 PROCEDURE — 36415 COLL VENOUS BLD VENIPUNCTURE: CPT | Performed by: PHYSICIAN ASSISTANT

## 2021-08-08 PROCEDURE — 81001 URINALYSIS AUTO W/SCOPE: CPT | Performed by: PHYSICIAN ASSISTANT

## 2021-08-08 PROCEDURE — 99223 1ST HOSP IP/OBS HIGH 75: CPT | Mod: 25 | Performed by: PHYSICIAN ASSISTANT

## 2021-08-08 PROCEDURE — 93321 DOPPLER ECHO F-UP/LMTD STD: CPT | Mod: 26 | Performed by: STUDENT IN AN ORGANIZED HEALTH CARE EDUCATION/TRAINING PROGRAM

## 2021-08-08 PROCEDURE — 85027 COMPLETE CBC AUTOMATED: CPT | Performed by: PHYSICIAN ASSISTANT

## 2021-08-08 PROCEDURE — 93321 DOPPLER ECHO F-UP/LMTD STD: CPT

## 2021-08-08 PROCEDURE — 999N000128 HC STATISTIC PERIPHERAL IV START W/O US GUIDANCE

## 2021-08-08 PROCEDURE — 93018 CV STRESS TEST I&R ONLY: CPT | Performed by: STUDENT IN AN ORGANIZED HEALTH CARE EDUCATION/TRAINING PROGRAM

## 2021-08-08 RX ORDER — HYDRALAZINE HYDROCHLORIDE 100 MG/1
100 TABLET, FILM COATED ORAL 3 TIMES DAILY
Status: DISCONTINUED | OUTPATIENT
Start: 2021-08-08 | End: 2021-08-10 | Stop reason: HOSPADM

## 2021-08-08 RX ORDER — AMOXICILLIN 250 MG
1 CAPSULE ORAL 2 TIMES DAILY PRN
Status: DISCONTINUED | OUTPATIENT
Start: 2021-08-08 | End: 2021-08-10 | Stop reason: HOSPADM

## 2021-08-08 RX ORDER — PROCHLORPERAZINE 25 MG
12.5 SUPPOSITORY, RECTAL RECTAL EVERY 12 HOURS PRN
Status: DISCONTINUED | OUTPATIENT
Start: 2021-08-08 | End: 2021-08-10 | Stop reason: HOSPADM

## 2021-08-08 RX ORDER — ISOSORBIDE DINITRATE 20 MG/1
20 TABLET ORAL 3 TIMES DAILY
Status: DISCONTINUED | OUTPATIENT
Start: 2021-08-08 | End: 2021-08-09

## 2021-08-08 RX ORDER — NITROGLYCERIN 0.4 MG/1
0.4 TABLET SUBLINGUAL EVERY 5 MIN PRN
Status: DISCONTINUED | OUTPATIENT
Start: 2021-08-08 | End: 2021-08-10 | Stop reason: HOSPADM

## 2021-08-08 RX ORDER — MAGNESIUM HYDROXIDE/ALUMINUM HYDROXICE/SIMETHICONE 120; 1200; 1200 MG/30ML; MG/30ML; MG/30ML
30 SUSPENSION ORAL EVERY 4 HOURS PRN
Status: DISCONTINUED | OUTPATIENT
Start: 2021-08-08 | End: 2021-08-10 | Stop reason: HOSPADM

## 2021-08-08 RX ORDER — NICOTINE POLACRILEX 4 MG
15-30 LOZENGE BUCCAL
Status: DISCONTINUED | OUTPATIENT
Start: 2021-08-08 | End: 2021-08-10 | Stop reason: HOSPADM

## 2021-08-08 RX ORDER — HYDROCHLOROTHIAZIDE 25 MG/1
50 TABLET ORAL DAILY
Status: DISCONTINUED | OUTPATIENT
Start: 2021-08-08 | End: 2021-08-10 | Stop reason: HOSPADM

## 2021-08-08 RX ORDER — CEFTRIAXONE 1 G/1
1 INJECTION, POWDER, FOR SOLUTION INTRAMUSCULAR; INTRAVENOUS EVERY 24 HOURS
Status: DISCONTINUED | OUTPATIENT
Start: 2021-08-09 | End: 2021-08-09

## 2021-08-08 RX ORDER — HYDRALAZINE HYDROCHLORIDE 20 MG/ML
10 INJECTION INTRAMUSCULAR; INTRAVENOUS EVERY 6 HOURS PRN
Status: DISCONTINUED | OUTPATIENT
Start: 2021-08-08 | End: 2021-08-08

## 2021-08-08 RX ORDER — ONDANSETRON 2 MG/ML
4 INJECTION INTRAMUSCULAR; INTRAVENOUS EVERY 6 HOURS PRN
Status: DISCONTINUED | OUTPATIENT
Start: 2021-08-08 | End: 2021-08-10 | Stop reason: HOSPADM

## 2021-08-08 RX ORDER — LIDOCAINE 40 MG/G
CREAM TOPICAL
Status: DISCONTINUED | OUTPATIENT
Start: 2021-08-08 | End: 2021-08-10 | Stop reason: HOSPADM

## 2021-08-08 RX ORDER — HEPARIN SODIUM 10000 [USP'U]/100ML
0-5000 INJECTION, SOLUTION INTRAVENOUS CONTINUOUS
Status: DISCONTINUED | OUTPATIENT
Start: 2021-08-08 | End: 2021-08-09

## 2021-08-08 RX ORDER — ASPIRIN 81 MG/1
81 TABLET ORAL DAILY
Status: DISCONTINUED | OUTPATIENT
Start: 2021-08-09 | End: 2021-08-10 | Stop reason: HOSPADM

## 2021-08-08 RX ORDER — PROCHLORPERAZINE MALEATE 5 MG
5 TABLET ORAL EVERY 6 HOURS PRN
Status: DISCONTINUED | OUTPATIENT
Start: 2021-08-08 | End: 2021-08-10 | Stop reason: HOSPADM

## 2021-08-08 RX ORDER — AMOXICILLIN 250 MG
2 CAPSULE ORAL 2 TIMES DAILY PRN
Status: DISCONTINUED | OUTPATIENT
Start: 2021-08-08 | End: 2021-08-10 | Stop reason: HOSPADM

## 2021-08-08 RX ORDER — POLYETHYLENE GLYCOL 3350 17 G/17G
17 POWDER, FOR SOLUTION ORAL DAILY PRN
Status: DISCONTINUED | OUTPATIENT
Start: 2021-08-08 | End: 2021-08-10 | Stop reason: HOSPADM

## 2021-08-08 RX ORDER — HYDRALAZINE HYDROCHLORIDE 20 MG/ML
10 INJECTION INTRAMUSCULAR; INTRAVENOUS EVERY 6 HOURS PRN
Status: DISCONTINUED | OUTPATIENT
Start: 2021-08-08 | End: 2021-08-10 | Stop reason: HOSPADM

## 2021-08-08 RX ORDER — ACETAMINOPHEN 325 MG/1
650 TABLET ORAL EVERY 4 HOURS PRN
Status: DISCONTINUED | OUTPATIENT
Start: 2021-08-08 | End: 2021-08-10 | Stop reason: HOSPADM

## 2021-08-08 RX ORDER — HYDRALAZINE HYDROCHLORIDE 50 MG/1
50 TABLET, FILM COATED ORAL 3 TIMES DAILY
Status: DISCONTINUED | OUTPATIENT
Start: 2021-08-08 | End: 2021-08-08

## 2021-08-08 RX ORDER — DEXTROSE MONOHYDRATE 25 G/50ML
25-50 INJECTION, SOLUTION INTRAVENOUS
Status: DISCONTINUED | OUTPATIENT
Start: 2021-08-08 | End: 2021-08-10 | Stop reason: HOSPADM

## 2021-08-08 RX ORDER — ACETAMINOPHEN 650 MG/1
650 SUPPOSITORY RECTAL EVERY 4 HOURS PRN
Status: DISCONTINUED | OUTPATIENT
Start: 2021-08-08 | End: 2021-08-10 | Stop reason: HOSPADM

## 2021-08-08 RX ORDER — ONDANSETRON 4 MG/1
4 TABLET, ORALLY DISINTEGRATING ORAL EVERY 6 HOURS PRN
Status: DISCONTINUED | OUTPATIENT
Start: 2021-08-08 | End: 2021-08-10 | Stop reason: HOSPADM

## 2021-08-08 RX ORDER — ATORVASTATIN CALCIUM 40 MG/1
40 TABLET, FILM COATED ORAL EVERY EVENING
Status: DISCONTINUED | OUTPATIENT
Start: 2021-08-08 | End: 2021-08-10 | Stop reason: HOSPADM

## 2021-08-08 RX ORDER — CARVEDILOL 25 MG/1
25 TABLET ORAL 2 TIMES DAILY WITH MEALS
Status: DISCONTINUED | OUTPATIENT
Start: 2021-08-08 | End: 2021-08-10 | Stop reason: HOSPADM

## 2021-08-08 RX ADMIN — INSULIN ASPART 7 UNITS: 100 INJECTION, SOLUTION INTRAVENOUS; SUBCUTANEOUS at 14:21

## 2021-08-08 RX ADMIN — HEPARIN SODIUM 1300 UNITS/HR: 10000 INJECTION, SOLUTION INTRAVENOUS at 14:04

## 2021-08-08 RX ADMIN — ISOSORBIDE DINITRATE 20 MG: 20 TABLET ORAL at 13:37

## 2021-08-08 RX ADMIN — CARVEDILOL 25 MG: 25 TABLET, FILM COATED ORAL at 17:48

## 2021-08-08 RX ADMIN — HYDRALAZINE HYDROCHLORIDE 100 MG: 100 TABLET, FILM COATED ORAL at 19:47

## 2021-08-08 RX ADMIN — HYDRALAZINE HYDROCHLORIDE 50 MG: 50 TABLET, FILM COATED ORAL at 13:37

## 2021-08-08 RX ADMIN — HYDROCHLOROTHIAZIDE 50 MG: 25 TABLET ORAL at 13:29

## 2021-08-08 RX ADMIN — ISOSORBIDE DINITRATE 20 MG: 20 TABLET ORAL at 19:47

## 2021-08-08 RX ADMIN — INSULIN ASPART 7 UNITS: 100 INJECTION, SOLUTION INTRAVENOUS; SUBCUTANEOUS at 17:48

## 2021-08-08 RX ADMIN — INSULIN ASPART 9 UNITS: 100 INJECTION, SOLUTION INTRAVENOUS; SUBCUTANEOUS at 22:05

## 2021-08-08 RX ADMIN — HUMAN ALBUMIN MICROSPHERES AND PERFLUTREN 5 ML: 10; .22 INJECTION, SOLUTION INTRAVENOUS at 12:35

## 2021-08-08 RX ADMIN — INSULIN GLARGINE 14 UNITS: 100 INJECTION, SOLUTION SUBCUTANEOUS at 22:04

## 2021-08-08 RX ADMIN — ATORVASTATIN CALCIUM 40 MG: 40 TABLET, FILM COATED ORAL at 19:47

## 2021-08-08 RX ADMIN — HEPARIN SODIUM 1000 UNITS/HR: 10000 INJECTION, SOLUTION INTRAVENOUS at 13:09

## 2021-08-08 ASSESSMENT — ACTIVITIES OF DAILY LIVING (ADL)
TOILETING_ASSISTANCE: TOILETING DIFFICULTY, ASSISTANCE 1 PERSON
CONCENTRATING,_REMEMBERING_OR_MAKING_DECISIONS_DIFFICULTY: NO
NUMBER_OF_TIMES_PATIENT_HAS_FALLEN_WITHIN_LAST_SIX_MONTHS: 1
ADLS_ACUITY_SCORE: 20
DRESSING/BATHING: BATHING DIFFICULTY, ASSISTANCE 1 PERSON
ADLS_ACUITY_SCORE: 20
PATIENT_/_FAMILY_COMMUNICATION_STYLE: SPOKEN LANGUAGE (ENGLISH OR BILINGUAL)
TOILETING_ISSUES: YES
ADLS_ACUITY_SCORE: 21
DRESSING/BATHING_DIFFICULTY: YES
DIFFICULTY_EATING/SWALLOWING: NO
FALL_HISTORY_WITHIN_LAST_SIX_MONTHS: YES
DIFFICULTY_COMMUNICATING: NO
WEAR_GLASSES_OR_BLIND: YES
HEARING_DIFFICULTY_OR_DEAF: NO
DOING_ERRANDS_INDEPENDENTLY_DIFFICULTY: NO
WALKING_OR_CLIMBING_STAIRS_DIFFICULTY: NO

## 2021-08-08 ASSESSMENT — MIFFLIN-ST. JEOR: SCORE: 2142.86

## 2021-08-08 NOTE — PLAN OF CARE
D: pt transferred from [place] ED to [facility] for further evaluation and management of elevated troponin, new LBBB, and hypertensive urgency. PMH of HTN, HFpEF, CVA in 2018 with residual left-sided weakness, insulin dependent DM2, obesity,CAD, BPH with urinary retention and indwelling catheter, and anxiety/depression     I: Monitored vitals and assessed pt status.     Running: Heparin 1300 units/ hr        Vitals:  Temp: 97.5  F (36.4  C) Temp src: Oral BP: (!) 172/93 Pulse: 82   Resp: 16 SpO2: 96 % O2 Device: None (Room air)        A:   Neuro: A&O x 4. Neurologically intact.  Denies  Headache, dizziness, lightheadedness and  Numbness. Report tingling on BLE (baseline per pt ) . left-sided weakness .  calls appropriately   Cardiac: SR,  Afebrile. Hypertensive at time. Denies Chest pain.   Respiratory: sating > 92 on RA.   Report occasional SOB.  LS clear, diminished at bases   Diet/appetite: 2g Na Diet. Good appetite. NPO starting at midnight   Endocrine: BS check AC&HS . Pt on sliding scale  insulin   GI/:  No BM this shift. Denies abdominal pain and nausea. Milner in place, Ongoing hematuria likely secondary to trauma from catheter exchange  Activity:  assist of 2  Pain: denies  pain managed with   Skin: no deficit noted. 2 RN skin assessment completed by Rich MADRIGAL and Izabella REGALADO  LDAs: PIV x 2       P:  Possible angiogram on 8/9/2021. Continue to monitor Pt status and report changes to treatment team.

## 2021-08-08 NOTE — H&P
[facility]   Cardiology Service  History and Physical      Lefty Quigley MRN# [id number]   YOB: 1945 Age: 76 year old       Admission Date: 8/8/2021      Assessment and plan:   Lefty Quigley is a 76 year old male with a history of HTN, HFpEF, CVA in 2018 with residual left-sided weakness, insulin dependent DM2, obesity (BMI 38), minimal CAD, BPH with urinary retention and indwelling catheter, and anxiety/depression. He was transferred from [place] ED to [facility] for further evaluation and management of elevated troponin, new LBBB, and hypertensive urgency.    # Elevated troponin   # New LBBB  # Hypertensive urgency  No history of chest pain or dyspnea on exertion. ED presentation at OSH was for mahajan catheter troubles. Due to hypertensive urgency with BP >200/110, ECG and troponin obtained. ECG with sinus rhythm and new LBBB that was not prior 1 year ago. Troponin elevated at 0.137 and 0.140. Patient transferred to [facility] for possible ischemic evaluation. Given that troponin is flat, in the setting of hypertensive urgency, and patient is asymptomatic, further workup with echocardiogram and troponin trending felt to be necessary before jumping to invasive coronary angiography. Up titrate home antihypertensives for better blood pressure control. Hx of angioedema with lisinopril and chest tightness with amlodipine.   - continue heparin gtt  - aspirin 81 mg   - statin: start atorvastatin 40 mg daily  - BB: carvedilol 25 mg BID (could increase to max dose of 50 mg BID)   - ACEI/ARB: contraindicated due to history of angioedema   - hydralazine 100 mg TID/isordil 20 mg TID (increased from hydralazine 50 mg TID/isordil 10 mg TID)   - hydrochlorothiazide 50 mg daily (max dose)  - PRN IV hydralazine   - echocardiogram   - ECG  - trend troponin   - possible coronary angiogram on 8/9 (consent obtained)     # Type 2 diabetes mellitus, insulin dependent  # Obesity, BMI 38  PTA  "regimen: metformin (held while inpatient), lantus 28 units BID, Novolog 10 units w/ meals   - reduce lantus to 14 units BID due to NPO tonight   - high intensity sliding scale insulin   - hypoglycemic protocol  - POC glucose checks  - Hgb A1c ordered    # BPH with urinary retention and chronic indwelling mahajan catheter  # Hematuria, secondary to trauma  # Urinary tract infection  Patient presented with concerns for cloudy urine and \"pus\" in mahajan catheter tubing that had been placed on 7/27 for chronic urinary retention. UA with signs c/f infection including bacteria and positive nitrites. Treated with ceftriaxone 2 g x1 in OSH. Ongoing hematuria likely secondary to trauma from catheter exchange. Urology curbsided and recommended continued monitoring. No need for intervention as long as catheter is draining.   - repeat UA/culture  - continue ceftriaxone 1 g q 24 hr until urine cultures result then plan to narrow oral abx  - daily CBC  - daily bladder scan  - recommend outpatient urology consultation for further evaluation of hematuria and retention    # History of CVA in 2018 with residual left-sided weakness  PT/OT        FEN: NPO after midnight for possible coronary angiogram   Code status: Full  Prophylaxis:  heparin gtt  Disposition: 1-2 days pending coronary angiogram     Patient seen and discussed with Dr. Macias. Documentation above represents joint decision-making.     Siobhan Salgado PA-C  [facility]  [phone]      Chief Complaint: New left bundle branch block, elevated troponin, hypertensive urgency    HPI:   Lefty Quigley is a 76 year old male with a history of HTN, HFpEF, CVA in 2018 with residual left-sided weakness, insulin dependent DM2, obesity (BMI 38), minimal CAD, BPH with urinary retention and indwelling catheter, and anxiety/depression. He was transferred from [facility] to [facility] for further evaluation and management of elevated troponin, new LBBB, and hypertensive " "urgency.    Patient initially presented to the ED on 8/7/21 due to concerns regarding his mahajan catheter, which was placed 10 days prior for urinary retention. He reports that his wife is a nurse and was concern about the presence of \"cloudy urine and pus in the tubing\" and wanted the mahajan catheter replaced. Patient denied any abdominal or pelvic pain. No hematuria. He did endorse increased fatigue over the prior 2 days. No fevers or chills. UA was performed and revealed moderate amount of bacteria and positive nitrites.  CBC with mild leukocytosis of 11.3. BMP with Cr of 1.22 and GFR of 58.     Patient was hypertensive with blood pressures of 200/110 but was asymptomatic without chest pain, dyspnea, headache, dizziness, lightheadedness, or presyncope. Blood pressure was controlled with increased dose of carvedilol 25 mg, hydralzine 50 mg x1, IV labetalol 15 mg x1, and nitroglycerin gtt and paste.     ECG was performed and revealed sinus rhythm with LBBB that was not present on last ECG in August 2020. Troponin was then checked and was elevated at 0.137 and 0.140. Due to new LBBB and elevated troponin, concerning for NSTEMI, patient was transferred to [facility] and admitted to the [facility] service for further evaluation.     He denies a history of exertional chest pain or dyspnea. No prior history of CAD. HE had an angiogram in 2018 at which time he had mild (10-40%) CAD but no obstructive disease. He does have risk factors for CAD including HTN, obesity, DM2, and family history of a father with premature CAD who passed away of an MI at 55 yo. Patient also has a history of a stroke.     Past Medical History   ASCVD (arteriosclerotic cardiovascular disease) 05/10/2001     Elevated liver function tests 05/17/1995     Fatigue 05/10/2001     Malaise 05/05/1997     Malaria 05/05/1997     Mixed anxiety and depressive disorder 05/05/1997     Obesity 05/10/2001     Proteinuria 05/10/2001     Sensorineural hearing loss 05/10/2001     " Sleep apnea 2001     TIA (transient ischemic attack) 05/10/2001     Type II or unspecified type diabetes mellitus without mention of complication, not stated as uncontrolled 02/10/2006     Umbilical hernia 2001     Unspecified disorder of kidney and ureter 2001     Unspecified essential hypertension 05/10/2001     Past Surgical History:   Procedure Laterality Date     ANGIOGRAM  2018     APPENDECTOMY       HERNIA REPAIR, UMBILICAL         No current facility-administered medications on file prior to encounter.  ASPIRIN ADULT LOW STRENGTH PO, Take by mouth daily  carvedilol (COREG) 25 MG tablet, Take 25 mg by mouth 2 times daily (with meals)   clopidogrel (PLAVIX) 75 MG tablet, Take 75 mg by mouth  hydrALAZINE (APRESOLINE) 50 MG tablet, Take 50 mg by mouth 3 times daily   hydrochlorothiazide (HYDRODIURIL) 50 MG tablet, Take 50 mg by mouth daily   Insulin Aspart (NOVOLOG SC), Inject 25 Units Subcutaneous 3 times daily (before meals)  insulin glargine (LANTUS SOLOSTAR PEN) 100 UNIT/ML pen, Inject 28 Units Subcutaneous 2 times daily   isosorbide dinitrate (ISORDIL) 10 MG tablet, Take 10 mg by mouth 3 times daily   metFORMIN (GLUCOPHAGE) 1000 MG tablet, Take 1,000 mg by mouth 2 times daily (with meals)   VITAMIN D, CHOLECALCIFEROL, PO, Take by mouth daily        Family History  Father  of MI at age 54.   2 adult daughters are alive and with no known medical conditions.   1 son  at age 3 of blood disorder.     Social History     Tobacco Use     Smoking status: Never Smoker     Smokeless tobacco: Never Used   Substance Use Topics     Alcohol use: No   . Lives in [place], [place] with his wife. Retired .     Allergies   Allergen Reactions     Ace Inhibitors      ANGIOEDEMA     Amlodipine      Other reaction(s): Chest Tightness       ROS:   CONSTITUTIONAL:No report of fevers or chills  RESPIRATORY: No cough, wheezing, SOB, or hemoptysis  CARDIOVASCULAR: see HPI  MUSCULO-SKELETAL: No  "joint pain/swelling, no muscle pain  NEURO: No paresthesias, syncope, pre-syncope, lightheadness, dizziness or vertigo  ENDOCRINE: No temperature intolerance, no skin/hair changes  PSYCHIATRIC: No change in mood, feeling down/anxious, no change in sleep or appetite  GI: no melena or hematochezia, no change in bowel habits  : no hematuria or dysuria, no hesitancy, dribbling or incontinence  HEME: no easy bruising or bleeding, no history of anemia, no history of blood clots  SKIN: no rashes or sores, no unusual itching    Physical Examination:  Vitals: BP (!) 186/95 (BP Location: Left arm)   Pulse 77   Temp 98.1  F (36.7  C) (Oral)   Resp 20   Ht 1.88 m (6' 2\")   Wt 133.8 kg (295 lb)   SpO2 94%   BMI 37.88 kg/m    BMI= Body mass index is 37.88 kg/m .    GENERAL APPEARANCE: Pleasant and well appearing elderly gentleman. Appears comfortable and in no acute distress. Alert and interactive.   HEENT: Normocephalic, atraumatic. Sclera clear. No xanthelasmas. normal pupil size and reaction, normal palate, mucosa moist  CHEST: Normal work of breathing. Lungs clear to auscultation without rales, rhonchi or wheezes, no use of accessory muscles, no retractions  CARDIOVASCULAR: regular rhythm, normal S1 and S2, no S3 or S4 and no murmur, click or rub. Radial and pedal pulses palpable bilaterally.   ABDOMEN: obese abdomen.  EXTREMITIES: warm, no lower extremity edema, clubbing or cyanosis   NEURO: alert and oriented to person/place/time, normal speech, left-sided weakness.   PSYCH: Intermittent episodes of laughing/crying that is inappropriate to the situation. Patient reports this is a side-effect of his prior stroke and is at baseline.   SKIN: no ecchymoses, no rashes, warm and dry to touch.       Laboratory:  CMP  Recent Labs   Lab 08/08/21  1333 08/08/21  1252   NA  --  138   POTASSIUM  --  4.4   CHLORIDE  --  105   CO2  --  28   ANIONGAP  --  5   * 253*   BUN  --  13   CR  --  1.06   GFRESTIMATED  --  68   RINA "  --  8.3*     CBC  Recent Labs   Lab 08/08/21  1252   WBC 10.3   RBC 4.46   HGB 13.3   HCT 39.2*   MCV 88   MCH 29.8   MCHC 33.9   RDW 13.2          Lab Results   Component Value Date    TROPI 0.064 (H) 01/22/2019    TROPI 0.055 (H) 01/22/2019    TROPI 0.093 (H) 11/26/2018    TROPONIN 0.214 (HH) 08/08/2021         EKG 8/8/21: personally reviewed: sinus rhythm with LBBB      TTE 8/9/2019 ([facility]):  Technically difficult study.   The left ventricular systolic function is normal.   There is mild concentric hypertrophy.   Left atrium is severely enlarged by volume.   The left ventricular diastolic function is mildly abnormal (Grade I) with indeterminate filling pressures.   No significant valvulars stenosis or insufficiency seen.   TR signal inadequate to allow accurate estimate RV systolic pressure.   Proximal ascending aorta is mildly enlarged.   Compared to echocardiogram dated 12/3/18, there has been improvement in left ventricular systolic function.   Interpretation Summary   Technically difficult study.   The left ventricular systolic function is normal.   There is mild concentric hypertrophy.   Left atrium is severely enlarged by volume.   The left ventricular diastolic function is mildly abnormal (Grade I) with indeterminate filling pressures.   No significant valvulars stenosis or insufficiency seen.   TR signal inadequate to allow accurate estimate RV systolic pressure.   Proximal ascending aorta is mildly enlarged.   Compared to echocardiogram dated 12/3/18, there has been improvement in left ventricular systolic function.     Coronary angiogram 11/26/2018 ([facility])  Left Main   The vessel was visualized by angiography and is large. The vessel exhibits minimal luminal irregularities.   Left Anterior Descending   The vessel was visualized by angiography and is moderate in size. Mild (10-40%).   First Diagonal Branch   Ost 1st Diag to 1st Diag lesion is 40% stenosed.   Second Diagonal Branch   Ost 2nd  Diag to 2nd Diag lesion is 30% stenosed.   Left Circumflex   The vessel was visualized by angiography and is moderate in size. The vessel exhibits minimal luminal irregularities.   Right Coronary Artery   The vessel was visualized by angiography and is moderate in size. Mild (10-40%).   Intervention  No interventions have been documented.

## 2021-08-08 NOTE — PROGRESS NOTES
"Patient to arrival to  at approx 1020 with EMS.   Patient A&Ox4, slide transfer from stretcher to bed. Milner catheter in place with bloody urine output. Left hand PIV infusing Nitroglycerin @ 35 mcg/min and Heparin @ 1000 units/hr. Nitroglycerin off. Heparin still infusing. Awaiting orders from team. Patient NPO. Denies pain. Siobhan Salgado PA-C at bedside.  Vital signs:  Temp: 98.8  F (37.1  C) Temp src: Oral BP: (!) 192/96 Pulse: 81   Resp: 20       Height: 188 cm (6' 2\") Weight: 133.8 kg (295 lb) (STATED)  Estimated body mass index is 37.88 kg/m  as calculated from the following:    Height as of this encounter: 1.88 m (6' 2\").    Weight as of this encounter: 133.8 kg (295 lb).           "

## 2021-08-09 ENCOUNTER — APPOINTMENT (OUTPATIENT)
Dept: ULTRASOUND IMAGING | Facility: CLINIC | Age: 76
DRG: 281 | End: 2021-08-09
Attending: PHYSICIAN ASSISTANT
Payer: MEDICARE

## 2021-08-09 LAB
ANION GAP SERPL CALCULATED.3IONS-SCNC: 2 MMOL/L (ref 3–14)
ATRIAL RATE - MUSE: 80 BPM
BUN SERPL-MCNC: 15 MG/DL (ref 7–30)
CALCIUM SERPL-MCNC: 8.5 MG/DL (ref 8.5–10.1)
CHLORIDE BLD-SCNC: 105 MMOL/L (ref 94–109)
CO2 SERPL-SCNC: 30 MMOL/L (ref 20–32)
CREAT SERPL-MCNC: 1.22 MG/DL (ref 0.66–1.25)
DIASTOLIC BLOOD PRESSURE - MUSE: NORMAL MMHG
ERYTHROCYTE [DISTWIDTH] IN BLOOD BY AUTOMATED COUNT: 13.1 % (ref 10–15)
GFR SERPL CREATININE-BSD FRML MDRD: 58 ML/MIN/1.73M2
GLUCOSE BLD-MCNC: 154 MG/DL (ref 70–99)
GLUCOSE BLDC GLUCOMTR-MCNC: 158 MG/DL (ref 70–99)
GLUCOSE BLDC GLUCOMTR-MCNC: 162 MG/DL (ref 70–99)
GLUCOSE BLDC GLUCOMTR-MCNC: 187 MG/DL (ref 70–99)
GLUCOSE BLDC GLUCOMTR-MCNC: 195 MG/DL (ref 70–99)
GLUCOSE BLDC GLUCOMTR-MCNC: 294 MG/DL (ref 70–99)
GLUCOSE BLDC GLUCOMTR-MCNC: 317 MG/DL (ref 70–99)
HCT VFR BLD AUTO: 38 % (ref 40–53)
HGB BLD-MCNC: 12.7 G/DL (ref 13.3–17.7)
INTERPRETATION ECG - MUSE: NORMAL
MCH RBC QN AUTO: 29.5 PG (ref 26.5–33)
MCHC RBC AUTO-ENTMCNC: 33.4 G/DL (ref 31.5–36.5)
MCV RBC AUTO: 88 FL (ref 78–100)
P AXIS - MUSE: 47 DEGREES
PLATELET # BLD AUTO: 270 10E3/UL (ref 150–450)
POTASSIUM BLD-SCNC: 3.7 MMOL/L (ref 3.4–5.3)
PR INTERVAL - MUSE: 174 MS
QRS DURATION - MUSE: 160 MS
QT - MUSE: 452 MS
QTC - MUSE: 521 MS
R AXIS - MUSE: -25 DEGREES
RBC # BLD AUTO: 4.3 10E6/UL (ref 4.4–5.9)
SODIUM SERPL-SCNC: 137 MMOL/L (ref 133–144)
SYSTOLIC BLOOD PRESSURE - MUSE: NORMAL MMHG
T AXIS - MUSE: 108 DEGREES
UFH PPP CHRO-ACNC: 0.3 IU/ML
UFH PPP CHRO-ACNC: 0.5 IU/ML
VENTRICULAR RATE- MUSE: 80 BPM
WBC # BLD AUTO: 8.1 10E3/UL (ref 4–11)

## 2021-08-09 PROCEDURE — 250N000011 HC RX IP 250 OP 636: Performed by: INTERNAL MEDICINE

## 2021-08-09 PROCEDURE — 99152 MOD SED SAME PHYS/QHP 5/>YRS: CPT | Performed by: INTERNAL MEDICINE

## 2021-08-09 PROCEDURE — 85520 HEPARIN ASSAY: CPT | Performed by: PHYSICIAN ASSISTANT

## 2021-08-09 PROCEDURE — 36415 COLL VENOUS BLD VENIPUNCTURE: CPT | Performed by: PHYSICIAN ASSISTANT

## 2021-08-09 PROCEDURE — 93454 CORONARY ARTERY ANGIO S&I: CPT | Performed by: INTERNAL MEDICINE

## 2021-08-09 PROCEDURE — B2111ZZ FLUOROSCOPY OF MULTIPLE CORONARY ARTERIES USING LOW OSMOLAR CONTRAST: ICD-10-PCS | Performed by: INTERNAL MEDICINE

## 2021-08-09 PROCEDURE — 93975 VASCULAR STUDY: CPT

## 2021-08-09 PROCEDURE — 250N000011 HC RX IP 250 OP 636: Performed by: PHYSICIAN ASSISTANT

## 2021-08-09 PROCEDURE — C1894 INTRO/SHEATH, NON-LASER: HCPCS | Performed by: INTERNAL MEDICINE

## 2021-08-09 PROCEDURE — 99233 SBSQ HOSP IP/OBS HIGH 50: CPT | Performed by: PHYSICIAN ASSISTANT

## 2021-08-09 PROCEDURE — 250N000013 HC RX MED GY IP 250 OP 250 PS 637: Performed by: PHYSICIAN ASSISTANT

## 2021-08-09 PROCEDURE — 85027 COMPLETE CBC AUTOMATED: CPT | Performed by: PHYSICIAN ASSISTANT

## 2021-08-09 PROCEDURE — 250N000012 HC RX MED GY IP 250 OP 636 PS 637: Performed by: PHYSICIAN ASSISTANT

## 2021-08-09 PROCEDURE — 272N000001 HC OR GENERAL SUPPLY STERILE: Performed by: INTERNAL MEDICINE

## 2021-08-09 PROCEDURE — 93975 VASCULAR STUDY: CPT | Mod: 26 | Performed by: RADIOLOGY

## 2021-08-09 PROCEDURE — 250N000009 HC RX 250: Performed by: INTERNAL MEDICINE

## 2021-08-09 PROCEDURE — 214N000001 HC R&B CCU UMMC

## 2021-08-09 PROCEDURE — 80048 BASIC METABOLIC PNL TOTAL CA: CPT | Performed by: PHYSICIAN ASSISTANT

## 2021-08-09 PROCEDURE — 76770 US EXAM ABDO BACK WALL COMP: CPT

## 2021-08-09 RX ORDER — NALOXONE HYDROCHLORIDE 0.4 MG/ML
0.4 INJECTION, SOLUTION INTRAMUSCULAR; INTRAVENOUS; SUBCUTANEOUS
Status: ACTIVE | OUTPATIENT
Start: 2021-08-09 | End: 2021-08-10

## 2021-08-09 RX ORDER — OXYCODONE HYDROCHLORIDE 5 MG/1
5 TABLET ORAL EVERY 4 HOURS PRN
Status: DISCONTINUED | OUTPATIENT
Start: 2021-08-09 | End: 2021-08-10 | Stop reason: HOSPADM

## 2021-08-09 RX ORDER — CEPHALEXIN 500 MG/1
500 CAPSULE ORAL EVERY 6 HOURS SCHEDULED
Status: DISCONTINUED | OUTPATIENT
Start: 2021-08-10 | End: 2021-08-10 | Stop reason: HOSPADM

## 2021-08-09 RX ORDER — OXYCODONE HYDROCHLORIDE 10 MG/1
10 TABLET ORAL EVERY 4 HOURS PRN
Status: DISCONTINUED | OUTPATIENT
Start: 2021-08-09 | End: 2021-08-10 | Stop reason: HOSPADM

## 2021-08-09 RX ORDER — FLUMAZENIL 0.1 MG/ML
0.2 INJECTION, SOLUTION INTRAVENOUS
Status: ACTIVE | OUTPATIENT
Start: 2021-08-09 | End: 2021-08-10

## 2021-08-09 RX ORDER — NALOXONE HYDROCHLORIDE 0.4 MG/ML
0.2 INJECTION, SOLUTION INTRAMUSCULAR; INTRAVENOUS; SUBCUTANEOUS
Status: ACTIVE | OUTPATIENT
Start: 2021-08-09 | End: 2021-08-10

## 2021-08-09 RX ORDER — SODIUM CHLORIDE 9 MG/ML
75 INJECTION, SOLUTION INTRAVENOUS CONTINUOUS
Status: DISCONTINUED | OUTPATIENT
Start: 2021-08-09 | End: 2021-08-09

## 2021-08-09 RX ORDER — CEPHALEXIN 500 MG/1
500 CAPSULE ORAL EVERY 12 HOURS
Qty: 10 CAPSULE | Refills: 0 | Status: CANCELLED | OUTPATIENT
Start: 2021-08-10

## 2021-08-09 RX ORDER — ASPIRIN 81 MG/1
162 TABLET, CHEWABLE ORAL ONCE
Status: COMPLETED | OUTPATIENT
Start: 2021-08-09 | End: 2021-08-09

## 2021-08-09 RX ORDER — FENTANYL CITRATE 50 UG/ML
INJECTION, SOLUTION INTRAMUSCULAR; INTRAVENOUS
Status: DISCONTINUED | OUTPATIENT
Start: 2021-08-09 | End: 2021-08-09 | Stop reason: HOSPADM

## 2021-08-09 RX ORDER — ISOSORBIDE DINITRATE 40 MG/1
40 TABLET ORAL 3 TIMES DAILY
Qty: 90 TABLET | Refills: 11 | Status: CANCELLED | OUTPATIENT
Start: 2021-08-09

## 2021-08-09 RX ORDER — FENTANYL CITRATE 50 UG/ML
25 INJECTION, SOLUTION INTRAMUSCULAR; INTRAVENOUS
Status: DISCONTINUED | OUTPATIENT
Start: 2021-08-09 | End: 2021-08-10 | Stop reason: HOSPADM

## 2021-08-09 RX ORDER — ISOSORBIDE DINITRATE 40 MG/1
40 TABLET ORAL 3 TIMES DAILY
Status: DISCONTINUED | OUTPATIENT
Start: 2021-08-09 | End: 2021-08-10 | Stop reason: HOSPADM

## 2021-08-09 RX ORDER — CEPHALEXIN 500 MG/1
500 CAPSULE ORAL EVERY 12 HOURS SCHEDULED
Status: DISCONTINUED | OUTPATIENT
Start: 2021-08-10 | End: 2021-08-09

## 2021-08-09 RX ORDER — ATROPINE SULFATE 0.1 MG/ML
0.5 INJECTION INTRAVENOUS
Status: ACTIVE | OUTPATIENT
Start: 2021-08-09 | End: 2021-08-10

## 2021-08-09 RX ADMIN — ASPIRIN 81 MG CHEWABLE TABLET 162 MG: 81 TABLET CHEWABLE at 14:06

## 2021-08-09 RX ADMIN — INSULIN ASPART 2 UNITS: 100 INJECTION, SOLUTION INTRAVENOUS; SUBCUTANEOUS at 12:26

## 2021-08-09 RX ADMIN — CEFTRIAXONE 1 G: 1 INJECTION, POWDER, FOR SOLUTION INTRAMUSCULAR; INTRAVENOUS at 12:07

## 2021-08-09 RX ADMIN — INSULIN ASPART 1 UNITS: 100 INJECTION, SOLUTION INTRAVENOUS; SUBCUTANEOUS at 17:41

## 2021-08-09 RX ADMIN — INSULIN GLARGINE 28 UNITS: 100 INJECTION, SOLUTION SUBCUTANEOUS at 21:16

## 2021-08-09 RX ADMIN — ISOSORBIDE DINITRATE 40 MG: 40 TABLET ORAL at 09:19

## 2021-08-09 RX ADMIN — HYDRALAZINE HYDROCHLORIDE 100 MG: 100 TABLET, FILM COATED ORAL at 09:22

## 2021-08-09 RX ADMIN — INSULIN ASPART 1 UNITS: 100 INJECTION, SOLUTION INTRAVENOUS; SUBCUTANEOUS at 05:26

## 2021-08-09 RX ADMIN — ASPIRIN 81 MG: 81 TABLET ORAL at 09:20

## 2021-08-09 RX ADMIN — INSULIN GLARGINE 14 UNITS: 100 INJECTION, SOLUTION SUBCUTANEOUS at 09:22

## 2021-08-09 RX ADMIN — INSULIN ASPART 7 UNITS: 100 INJECTION, SOLUTION INTRAVENOUS; SUBCUTANEOUS at 21:15

## 2021-08-09 RX ADMIN — CARVEDILOL 25 MG: 25 TABLET, FILM COATED ORAL at 17:52

## 2021-08-09 RX ADMIN — ATORVASTATIN CALCIUM 40 MG: 40 TABLET, FILM COATED ORAL at 19:51

## 2021-08-09 RX ADMIN — INSULIN ASPART 8 UNITS: 100 INJECTION, SOLUTION INTRAVENOUS; SUBCUTANEOUS at 01:00

## 2021-08-09 RX ADMIN — ISOSORBIDE DINITRATE 40 MG: 40 TABLET ORAL at 14:06

## 2021-08-09 RX ADMIN — ISOSORBIDE DINITRATE 40 MG: 40 TABLET ORAL at 19:51

## 2021-08-09 RX ADMIN — HYDRALAZINE HYDROCHLORIDE 100 MG: 100 TABLET, FILM COATED ORAL at 19:51

## 2021-08-09 RX ADMIN — HYDROCHLOROTHIAZIDE 50 MG: 25 TABLET ORAL at 09:19

## 2021-08-09 RX ADMIN — INSULIN ASPART 3 UNITS: 100 INJECTION, SOLUTION INTRAVENOUS; SUBCUTANEOUS at 09:43

## 2021-08-09 RX ADMIN — HYDRALAZINE HYDROCHLORIDE 100 MG: 100 TABLET, FILM COATED ORAL at 14:06

## 2021-08-09 RX ADMIN — HEPARIN SODIUM 1600 UNITS/HR: 10000 INJECTION, SOLUTION INTRAVENOUS at 00:58

## 2021-08-09 RX ADMIN — CARVEDILOL 25 MG: 25 TABLET, FILM COATED ORAL at 09:21

## 2021-08-09 ASSESSMENT — ACTIVITIES OF DAILY LIVING (ADL)
ADLS_ACUITY_SCORE: 21

## 2021-08-09 NOTE — PROGRESS NOTES
[facility]   Cardiology Consults  Progress Note     Interval History:  - no acute events overnight. Remains hypertensive despite increase in PTA medications. No chest pain, dyspnea, fever, chills, nausea  - BP remains poorly controlled this AM    Physical Exam:  Temp:  [97.5  F (36.4  C)-98.8  F (37.1  C)] 98.4  F (36.9  C)  Pulse:  [69-83] 69  Resp:  [16-20] 20  BP: (148-194)/() 185/94  SpO2:  [91 %-96 %] 92 %      Intake/Output Summary (Last 24 hours) at 8/9/2021 0946  Last data filed at 8/9/2021 0112  Gross per 24 hour   Intake 624.08 ml   Output 1225 ml   Net -600.92 ml         Wt:   Wt Readings from Last 5 Encounters:   08/08/21 133.8 kg (295 lb)   07/25/21 134.1 kg (295 lb 11.2 oz)   11/26/18 122.5 kg (270 lb)       GEN: NAD, awake, alert, pleasant  Pulm: CTAB, no wheeze or rhonchi  Cardiac: RRR, JVP difficult to assess d/t body habitus, no murmurs  Vascular: 1+ bilateral lower extremity edema and palpable pulses  GI: soft, non distended  Neuro: CN II-XII grossly intact    Medications:    aspirin  81 mg Oral Daily     atorvastatin  40 mg Oral QPM     carvedilol  25 mg Oral BID w/meals     cefTRIAXone  1 g Intravenous Q24H     hydrALAZINE  100 mg Oral TID     hydrochlorothiazide  50 mg Oral Daily     insulin aspart  1-12 Units Subcutaneous Q4H     insulin glargine  14 Units Subcutaneous BID     isosorbide dinitrate  40 mg Oral TID     sodium chloride (PF)  3 mL Intracatheter Q8H       heparin 1,600 Units/hr (08/09/21 0058)     - MEDICATION INSTRUCTIONS -       - MEDICATION INSTRUCTIONS -         Labs:   CMP  Recent Labs   Lab 08/09/21  0549 08/09/21  0520 08/09/21  0048 08/08/21  2203 08/08/21  1252     --   --   --  138   POTASSIUM 3.7  --   --   --  4.4   CHLORIDE 105  --   --   --  105   CO2 30  --   --   --  28   ANIONGAP 2*  --   --   --  5   * 162* 317* 345* 253*   BUN 15  --   --   --  13   CR 1.22  --   --   --  1.06   GFRESTIMATED 58*  --   --   --  68    RINA 8.5  --   --   --  8.3*     CBC  Recent Labs   Lab 08/09/21  0549 08/08/21  1252   WBC 8.1 10.3   RBC 4.30* 4.46   HGB 12.7* 13.3   HCT 38.0* 39.2*   MCV 88 88   MCH 29.5 29.8   MCHC 33.4 33.9   RDW 13.1 13.2    262     INRNo lab results found in last 7 days.  Arterial Blood GasNo lab results found in last 7 days.    Diagnostics:    TTE 8/8/2021  Interpretation Summary  Technically difficult study.Extremely difficult acoustic windows despite the  use of contrast for endcardial border definition.  Left ventricular function is decreased. The ejection fraction is 50-55%  (borderline).  There is hypokinesis of the apical and mid to apical septal segments.  Abnormal septal motion consistent with left bundle branch block is present.  The right ventricle is normal size.  Global right ventricular function is normal.  No pericardial effusion is present.  ______________________________________________________________________________  Left Ventricle  Left ventricular function is decreased. The ejection fraction is 50-55%  (borderline). There is hypokinesis of the apical and mid to apical septal  segments. Abnormal septal motion consistent with left bundle branch block is  present.     Right Ventricle  The right ventricle is normal size. Global right ventricular function is  normal.     Aortic Valve  The valve leaflets are not well visualized. On Doppler interrogation, there is  no significant stenosis or regurgitation.     Tricuspid Valve  The tricuspid valve is normal. Trace tricuspid insufficiency is present.  Pulmonary artery systolic pressure cannot be assessed.     Pulmonic Valve  On Doppler interrogation, there is no significant stenosis or regurgitation.  The valve leaflets are not well visualized.     Vessels  IVC diameter and respiratory changes fall into an intermediate range  suggesting an RA pressure of 8 mmHg.     Pericardium  No pericardial effusion is present.    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 He was transferred from [place] ED to [facility] for further evaluation and management of elevated troponin, new LBBB, and hypertensive urgency.     # Elevated troponin   # New LBBB  # Hypertensive urgency  No history of chest pain or dyspnea on exertion. ED presentation at OSH was for mahajan catheter troubles. Due to hypertensive urgency with BP >200/110, ECG and troponin obtained. ECG with sinus rhythm and new LBBB that was not prior 1 year ago. Troponin elevated at 0.137 and 0.140. Patient transferred to [facility] for possible ischemic evaluation. Given that troponin is flat, in the setting of hypertensive urgency, and patient is asymptomatic, further workup with echocardiogram and troponin trending felt to be necessary before jumping to invasive coronary angiography. Hx of angioedema with lisinopril and chest tightness with amlodipine.   - continue heparin gtt  - aspirin 81 mg   - statin: atorvastatin 40 mg daily  - BB: carvedilol 25 mg BID (will consider increase to max dose of 50 mg BID)   - ACEI/ARB: contraindicated due to history of angioedema   - hydralazine 100 mg TID/isordil 40 mg TID (increased from hydralazine 50 mg TID/isordil 10 mg TID)   - hydrochlorothiazide 50 mg daily (max dose)  - PRN IV hydralazine   - echocardiogram   - ECG  - trend troponin   - renal artery US today  - coronary angiogram today (consent obtained)      # Type 2 diabetes mellitus, insulin dependent  # Obesity, BMI 38  PTA regimen: metformin (held while inpatient), lantus 28 units BID, Novolog 10 units w/ meals   - reduce lantus to 14 units BID due to NPO   - high intensity sliding scale insulin   - hypoglycemic protocol  - POC glucose checks  - Hgb A1c 6.9     # BPH with urinary retention and chronic indwelling mahajan  "catheter  # Hematuria, secondary to trauma  # Urinary tract infection, possibly 2/2 mahajan  Patient presented with concerns for cloudy urine and \"pus\" in mahajan catheter tubing that had been placed on 7/27 for chronic urinary retention. UA with signs c/f infection including bacteria and positive nitrites. Treated with ceftriaxone 2 g x1 in OSH. Ongoing hematuria likely secondary to trauma from catheter exchange. Urology curbsided and recommended continued monitoring. No need for intervention as long as catheter is draining.   - repeat UA/culture  - continue ceftriaxone 1 g q 24 hr for now  - daily CBC  - daily bladder scan  - recommend outpatient urology consultation for further evaluation of hematuria and retention     # History of CVA in 2018 with residual left-sided weakness  PT/OT          FEN: NPO after midnight for possible coronary angiogram   Code status: Full  Prophylaxis:  heparin gtt  Disposition: 1-2 days pending coronary angiogram     Patient discussed with Dr. Macias, who agrees with above plan.      Elgin Knight PA-C  [facility] Cardiology Team        "

## 2021-08-09 NOTE — PLAN OF CARE
Temp: 97.5  F (36.4  C) Temp src: Oral BP: (!) 158/90 Pulse: 67   Resp: 18 SpO2: 96 % O2 Device: None (Room air) Oxygen Delivery: 2 LPM     D: 76 year old male with a history of HTN, HFpEF, CVA in 2018 with residual left-sided weakness, insulin dependent DM2, obesity (BMI 38), minimal CAD, BPH with urinary retention and indwelling catheter, and anxiety/depression. He was transferred from [facility] to [facility] for further evaluation and management of elevated troponin, new LBBB, and hypertensive urgency.    I/A: Mahamed (he/him) is A/O x 4. Tele in place, SR w/ LBBB. VSS on room air. High BPs, team aware. Troponin trending downwards. Denies chest pain. L and R PIVs in place, SL. Mahajan catheter in place with adequate drainage of red urine (d/t suspected trauma from mahajan placement). Angiogram completed today, no bleeding from R groin site and no interventions necessary. Off bedrest at 1800. BG checks AC/HS. Up 1-2 assist with walker and gait belt.     P: Continue to monitor and follow POC. Plan for discharge in the morning. Notify [facility] with changes.     Ellie Garcia RN

## 2021-08-09 NOTE — PROVIDER NOTIFICATION
Cardiology cross cover notified that patient had a 7 beat run of VT rate 174 at 1943. Pt asymptomatic and vitally stable. No new orders.

## 2021-08-09 NOTE — PLAN OF CARE
Admitted 8/8 from OSH for elevated troponin and new left bundle branch block. History of HTN, HF, CVA 2018 with left sided weakness, DM 2, obesity, CAD, BPH with retention, anxiety, and depression.    Neuro: A&Ox4. Baseline left sided weakness. Other neuros intact. Slept well overnight.  Cardiac: Sinus rhythm with BBB. 7 beat run of VT at 1943.  Hypertensive. MDs aware.  Respiratory: Sating 90s on RA.  GI/: Milner in place for retention. Hematuria present. Last BM 8/8.  Diet/appetite: Tolerating 2 gram Na diet. Eating well. NPO since midnight.  Endo: BGs 300s for majority of shift. Improved to 162 this morning. Insulin given per orders.  Activity:  pt declined getting out of bed this shift.  Pain: Denies pain  Skin: No new deficits noted.  LDA's: Right PIV saline locked. Left PIV infusing Heparin at 1600 units/hr.    Plan: Plan for angiogram today. Will continue to monitor and notify team accordingly.

## 2021-08-09 NOTE — DISCHARGE SUMMARY
[address] [address]  p: [phone]    Discharge Summary: Cardiology Service    Lefty Quigley MRN# [id number]   YOB: 1945 Age: 76 year old       Admission Date: 8/8/2021  Discharge Date: 08/10/21      Discharge Diagnoses:  1. Type II MI 2/2 HTN Emergency  2. New LBBB  3. Type 2 DM  4. Obesity  5. BPH  6. Hematuria  7. UTI  8. History of CVA 2018 with residual left sided weakness    Pertinent Procedures:  1. Coronary angiogram     Consults:  NA    Imaging with results:  Coronary angiogram 8/9/2021  Diagnostic  Dominance: Right  Left Anterior Descending   Prox LAD lesion is 30% stenosed.   First Diagonal Branch   1st Diag lesion is 50% stenosed.   Left Circumflex   First Obtuse Marginal Branch   1st Mrg lesion is 40% stenosed.   Right Coronary Artery   Prox RCA lesion is 30% stenosed.   Dist RCA lesion is 20% stenosed.     Echocardiogram 8/8/2021  Interpretation Summary  Technically difficult study.Extremely difficult acoustic windows despite the  use of contrast for endcardial border definition.  Left ventricular function is decreased. The ejection fraction is 50-55%  (borderline).  There is hypokinesis of the apical and mid to apical septal segments.  Abnormal septal motion consistent with left bundle branch block is present.  The right ventricle is normal size.  Global right ventricular function is normal.  No pericardial effusion is present.  ______________________________________________________________________________  Left Ventricle  Left ventricular function is decreased. The ejection fraction is 50-55%  (borderline). There is hypokinesis of the apical and mid to apical septal  segments. Abnormal septal motion consistent with left bundle branch block is  present.     Right Ventricle  The right ventricle is normal size. Global right ventricular function is  normal.     Aortic Valve  The valve leaflets are not well  visualized. On Doppler interrogation, there is  no significant stenosis or regurgitation.     Tricuspid Valve  The tricuspid valve is normal. Trace tricuspid insufficiency is present.  Pulmonary artery systolic pressure cannot be assessed.     Pulmonic Valve  On Doppler interrogation, there is no significant stenosis or regurgitation.  The valve leaflets are not well visualized.     Vessels  IVC diameter and respiratory changes fall into an intermediate range  suggesting an RA pressure of 8 mmHg.     Pericardium  No pericardial effusion is present.    Brief HPI:  Lefty Quigley is a 76 year old male with a history of HTN, HFpEF, CVA in 2018 with residual left-sided weakness, insulin dependent DM2, obesity (BMI 38), minimal CAD, BPH with urinary retention and indwelling catheter, and anxiety/depression. He was transferred from [place] ED to [facility] for further evaluation and management of elevated troponin, new LBBB, and hypertensive urgency.    Hospital Course by Diagnosis:  # Elevated troponin   # New LBBB  # Hypertensive urgency  No history of chest pain or dyspnea on exertion. ED presentation at OSH was for mahajan catheter troubles. Due to hypertensive urgency with BP >200/110, ECG and troponin obtained. ECG with sinus rhythm and new LBBB that was not prior 1 year ago. Troponin elevated at 0.137 and 0.140. Patient transferred to [facility] for possible ischemic evaluation. Troponin trend relatively flat, however, given risk factors and wall motion abnormalities as above on TTE, patient sent for inpatient coronary angiogram which revealed non-obstructive CAD. NSTEMI deemed 2/2 HTN emergency, which improved markedly with increased hydralazine, coreg, and isordil.  - aspirin 81 mg   - statin: atorvastatin 20mg daily (patient reporting hx of non-specific adverse effects on high intensity statin previously)  - BB: carvedilol 25 mg BID (consider increase to 50mg bid if ongoing HTN)  - ACEI/ARB: contraindicated due to history  "of angioedema   - hydralazine 100 mg TID/isordil 40 mg TID (increased from hydralazine 50 mg TID/isordil 10 mg TID)   - hydrochlorothiazide 50 mg daily (max dose)  - renal artery US with poor acoustic windows and non-diagnostic. However, given improvement in BP will defer on additional imaging for renal artery stenosis at this time.     # Type 2 diabetes mellitus, insulin dependent  # Obesity, BMI 38  - resume PTA metformin   - resume PTA insulin     # BPH with urinary retention and chronic indwelling mahajan catheter  # Hematuria, secondary to trauma  # Urinary tract infection, possibly 2/2 mahajan  Patient presented with concerns for cloudy urine and \"pus\" in mahajan catheter tubing that had been placed on 7/27 for chronic urinary retention. UA with signs c/f infection including bacteria and positive nitrites. Treated with ceftriaxone 2 g x1 in OSH. Ongoing hematuria likely secondary to trauma from catheter exchange. Urology curbsided and recommended continued monitoring. No need for intervention as long as catheter is draining.   - keflex 500mg bid x5 days to complete 7 day course  - follow up with Urology as previously arranged 8/16/2021     # History of CVA in 2018 with residual left-sided weakness  PT/OT        Condition on discharge  Temp:  [98  F (36.7  C)-98.4  F (36.9  C)] 98  F (36.7  C)  Pulse:  [69-80] 72  Resp:  [18-20] 18  BP: (133-185)/() 133/77  SpO2:  [92 %-94 %] 92 %  General: Alert, interactive, NAD  Eyes: sclera anicteric, EOMI  Neck: JVP difficult to assess 2/2 body habitus  Cardiovascular: regular rate and rhythm, normal S1 and S2, no murmurs, gallops, or rubs  Resp: clear to auscultation bilaterally, no rales, wheezes, or rhonchi  GI: Soft, nontender, nondistended. +BS.  No HSM or masses, no rebound or guarding.  Extremities: no edema, no cyanosis or clubbing, dorsalis pedis and posterior tibialis pulses 2+ bilaterally  Skin: Warm and dry, no jaundice or rash  Neuro: CN 2-12 intact, moves all " extremities equally  Psych: Alert & oriented x 3      Medication Changes:    Discharge medications:   Current Discharge Medication List      START taking these medications    Details   atorvastatin (LIPITOR) 20 MG tablet Take 1 tablet (20 mg) by mouth every evening  Qty: 30 tablet, Refills: 11    Associated Diagnoses: Coronary artery disease involving native coronary artery of native heart without angina pectoris      cephALEXin (KEFLEX) 500 MG capsule Take 1 capsule (500 mg) by mouth 2 times daily  Qty: 10 capsule, Refills: 0    Associated Diagnoses: Urinary tract infection associated with catheterization of urinary tract, unspecified indwelling urinary catheter type, subsequent encounter         CONTINUE these medications which have CHANGED    Details   hydrALAZINE (APRESOLINE) 100 MG tablet Take 1 tablet (100 mg) by mouth 3 times daily  Qty: 90 tablet, Refills: 11    Associated Diagnoses: Hypertensive emergency      isosorbide Dinitrate (ISORDIL) 40 MG TABS Take 1 tablet (40 mg) by mouth 3 times daily  Qty: 90 tablet, Refills: 11    Associated Diagnoses: Hypertensive emergency         CONTINUE these medications which have NOT CHANGED    Details   ASPIRIN ADULT LOW STRENGTH PO Take by mouth daily      carvedilol (COREG) 25 MG tablet Take 25 mg by mouth 2 times daily (with meals)       clopidogrel (PLAVIX) 75 MG tablet Take 75 mg by mouth      hydrochlorothiazide (HYDRODIURIL) 50 MG tablet Take 50 mg by mouth daily       Insulin Aspart (NOVOLOG SC) Inject 25 Units Subcutaneous 3 times daily (before meals)      insulin glargine (LANTUS SOLOSTAR PEN) 100 UNIT/ML pen Inject 28 Units Subcutaneous 2 times daily       metFORMIN (GLUCOPHAGE) 1000 MG tablet Take 1,000 mg by mouth 2 times daily (with meals)       VITAMIN D, CHOLECALCIFEROL, PO Take by mouth daily             Labs or imaging requiring follow-up after discharge:  NA      Follow-up:  PCP within 7 days for BP check  Urology as previously arranged on  8/16/2021    Code status:  FULL    45 minutes spent in discharge, including >50% in counseling and coordination of care, medication review and plan of care recommended on follow up. Questions were answered.   It was our pleasure to care for Lefty Quigley during this hospitalization. Please do not hesitate to contact me should there be questions regarding the hospital course or discharge plan.        Elgin Knight PA-C  [facility] Cardiology Team

## 2021-08-10 VITALS
DIASTOLIC BLOOD PRESSURE: 100 MMHG | RESPIRATION RATE: 20 BRPM | WEIGHT: 295 LBS | BODY MASS INDEX: 37.86 KG/M2 | HEIGHT: 74 IN | OXYGEN SATURATION: 94 % | SYSTOLIC BLOOD PRESSURE: 192 MMHG | HEART RATE: 72 BPM | TEMPERATURE: 98.1 F

## 2021-08-10 LAB
ANION GAP SERPL CALCULATED.3IONS-SCNC: 7 MMOL/L (ref 3–14)
BUN SERPL-MCNC: 15 MG/DL (ref 7–30)
CALCIUM SERPL-MCNC: 9.2 MG/DL (ref 8.5–10.1)
CHLORIDE BLD-SCNC: 104 MMOL/L (ref 94–109)
CO2 SERPL-SCNC: 28 MMOL/L (ref 20–32)
CREAT SERPL-MCNC: 1.2 MG/DL (ref 0.66–1.25)
ERYTHROCYTE [DISTWIDTH] IN BLOOD BY AUTOMATED COUNT: 13.2 % (ref 10–15)
GFR SERPL CREATININE-BSD FRML MDRD: 59 ML/MIN/1.73M2
GLUCOSE BLD-MCNC: 206 MG/DL (ref 70–99)
GLUCOSE BLDC GLUCOMTR-MCNC: 191 MG/DL (ref 70–99)
GLUCOSE BLDC GLUCOMTR-MCNC: 226 MG/DL (ref 70–99)
HCT VFR BLD AUTO: 38.8 % (ref 40–53)
HGB BLD-MCNC: 12.9 G/DL (ref 13.3–17.7)
MCH RBC QN AUTO: 29.6 PG (ref 26.5–33)
MCHC RBC AUTO-ENTMCNC: 33.2 G/DL (ref 31.5–36.5)
MCV RBC AUTO: 89 FL (ref 78–100)
PLATELET # BLD AUTO: 273 10E3/UL (ref 150–450)
POTASSIUM BLD-SCNC: 3.8 MMOL/L (ref 3.4–5.3)
RBC # BLD AUTO: 4.36 10E6/UL (ref 4.4–5.9)
SODIUM SERPL-SCNC: 139 MMOL/L (ref 133–144)
UFH PPP CHRO-ACNC: <0.1 IU/ML
WBC # BLD AUTO: 7.8 10E3/UL (ref 4–11)

## 2021-08-10 PROCEDURE — 80048 BASIC METABOLIC PNL TOTAL CA: CPT | Performed by: PHYSICIAN ASSISTANT

## 2021-08-10 PROCEDURE — 36415 COLL VENOUS BLD VENIPUNCTURE: CPT | Performed by: PHYSICIAN ASSISTANT

## 2021-08-10 PROCEDURE — 99239 HOSP IP/OBS DSCHRG MGMT >30: CPT | Performed by: PHYSICIAN ASSISTANT

## 2021-08-10 PROCEDURE — 85027 COMPLETE CBC AUTOMATED: CPT | Performed by: PHYSICIAN ASSISTANT

## 2021-08-10 PROCEDURE — 85520 HEPARIN ASSAY: CPT | Performed by: STUDENT IN AN ORGANIZED HEALTH CARE EDUCATION/TRAINING PROGRAM

## 2021-08-10 PROCEDURE — 250N000013 HC RX MED GY IP 250 OP 250 PS 637: Performed by: PHYSICIAN ASSISTANT

## 2021-08-10 RX ORDER — HYDRALAZINE HYDROCHLORIDE 100 MG/1
100 TABLET, FILM COATED ORAL 3 TIMES DAILY
Qty: 90 TABLET | Refills: 11 | Status: SHIPPED | OUTPATIENT
Start: 2021-08-10

## 2021-08-10 RX ORDER — ATORVASTATIN CALCIUM 20 MG/1
20 TABLET, FILM COATED ORAL EVERY EVENING
Qty: 30 TABLET | Refills: 11 | Status: SHIPPED | OUTPATIENT
Start: 2021-08-10

## 2021-08-10 RX ORDER — ISOSORBIDE DINITRATE 40 MG/1
40 TABLET ORAL 3 TIMES DAILY
Qty: 90 TABLET | Refills: 11 | Status: SHIPPED | OUTPATIENT
Start: 2021-08-10

## 2021-08-10 RX ORDER — IOPAMIDOL 755 MG/ML
INJECTION, SOLUTION INTRAVASCULAR
Status: DISCONTINUED | OUTPATIENT
Start: 2021-08-09 | End: 2021-08-10 | Stop reason: HOSPADM

## 2021-08-10 RX ORDER — CEPHALEXIN 500 MG/1
500 CAPSULE ORAL 2 TIMES DAILY
Qty: 10 CAPSULE | Refills: 0 | Status: SHIPPED | OUTPATIENT
Start: 2021-08-10

## 2021-08-10 RX ADMIN — INSULIN ASPART 3 UNITS: 100 INJECTION, SOLUTION INTRAVENOUS; SUBCUTANEOUS at 08:40

## 2021-08-10 RX ADMIN — CARVEDILOL 25 MG: 25 TABLET, FILM COATED ORAL at 08:34

## 2021-08-10 RX ADMIN — INSULIN ASPART 4 UNITS: 100 INJECTION, SOLUTION INTRAVENOUS; SUBCUTANEOUS at 03:13

## 2021-08-10 RX ADMIN — HYDRALAZINE HYDROCHLORIDE 100 MG: 100 TABLET, FILM COATED ORAL at 08:34

## 2021-08-10 RX ADMIN — ASPIRIN 81 MG: 81 TABLET ORAL at 08:33

## 2021-08-10 RX ADMIN — INSULIN GLARGINE 28 UNITS: 100 INJECTION, SOLUTION SUBCUTANEOUS at 08:36

## 2021-08-10 RX ADMIN — CEPHALEXIN 500 MG: 500 CAPSULE ORAL at 08:34

## 2021-08-10 RX ADMIN — HYDROCHLOROTHIAZIDE 50 MG: 25 TABLET ORAL at 08:34

## 2021-08-10 RX ADMIN — ISOSORBIDE DINITRATE 40 MG: 40 TABLET ORAL at 08:35

## 2021-08-10 ASSESSMENT — ACTIVITIES OF DAILY LIVING (ADL)
ADLS_ACUITY_SCORE: 21

## 2021-08-10 NOTE — PLAN OF CARE
Temp: 97.5  F (36.4  C) Temp src: Oral BP: (!) 165/93 Pulse: 75   Resp: 16 SpO2: 94 % O2 Device: None (Room air) Oxygen Delivery: 2 LPM     D: Transferred from OSH with hypertensive urgency and elevated troponin. Now s/p coronary angio 8/9. Hx HTN, HFpEF, CVA in 2018 with residual left-sided weakness, DM, CAD, BPH with urinary retention and indwelling catheter, and anxiety/depression    I/A: Mahamed (he/him) is A&O x4. Tele in place, SR. VSS on RA. PIV x2 in place, SL. R groin site WDL, CMS intact. Milner in place draining adequately. Up with assist of 1. Slept well overnight    P: Continue to monitor and follow POC. Plan for discharge 8/10. Notify [facility] with changes

## 2021-08-10 NOTE — PLAN OF CARE
DISCHARGE   [facility] providers notified of Pt's BP of 192/100 taken at 0844  Discharged to: Home  Via: Automobile  Accompanied by: Family  Discharge Instructions: diet, activity, medications, follow up appointments, when to call the MD, and what to watchout for (i.e. s/s of infection, increasing SOB, palpitations, chest pain, bleeding from groin site, symptoms of high blood pressure)  Prescriptions: To be filled by [place] discharge pharmacy per pt's request; medication list reviewed & sent with pt  Follow Up Appointments: arranged; information given  Belongings: All sent with pt  IV: out  Telemetry: off  Pt exhibits understanding of above discharge instructions; all questions answered.    Ellie Garcia RN

## 2021-08-12 ENCOUNTER — TELEPHONE (OUTPATIENT)
Dept: CARDIOLOGY | Facility: CLINIC | Age: 76
End: 2021-08-12

## 2021-08-12 NOTE — TELEPHONE ENCOUNTER
S-(situation): patient states he is doing well post angiogram on the 9th. His groin site is flat and dry and is not bothering him.  Non-obstructive coronary artery disease       B-(background): Lefty Quigley is a 76 year old male with a history of HTN, HFpEF, CVA in 2018 with residual left-sided weakness, insulin dependent DM2, obesity (BMI 38), minimal CAD, BPH with urinary retention and indwelling catheter, and anxiety/depression. He was transferred from [facility] to [facility] for further evaluation and management of elevated troponin, new LBBB, and hypertensive urgency. Referred for coronary angiogram.    A-(assessment): Non-obstructive coronary artery disease       R-(recommendations): patient is going to follow up with his PCP for BP control

## 2022-02-17 PROBLEM — E11.65 TYPE 2 DIABETES MELLITUS WITH HYPERGLYCEMIA (H): Status: ACTIVE | Noted: 2018-11-26

## 2024-07-30 ENCOUNTER — MEDICAL CORRESPONDENCE (OUTPATIENT)
Dept: HEALTH INFORMATION MANAGEMENT | Facility: HOSPITAL | Age: 79
End: 2024-07-30

## 2024-07-30 ENCOUNTER — TRANSFERRED RECORDS (OUTPATIENT)
Dept: HEALTH INFORMATION MANAGEMENT | Facility: CLINIC | Age: 79
End: 2024-07-30

## 2024-07-31 ENCOUNTER — TELEPHONE (OUTPATIENT)
Dept: OTOLARYNGOLOGY | Facility: OTHER | Age: 79
End: 2024-07-31

## (undated) DEVICE — INTRO SHEATH AVANTI 4FRX23CM 504604T

## (undated) DEVICE — CATH DIAG 4FR JL 6.0  538424

## (undated) DEVICE — CATH ANGIO INFINITI 3DRC 4FRX100CM 538476

## (undated) DEVICE — MANIFOLD KIT ANGIO AUTOMATED 014613

## (undated) DEVICE — PACK HEART LEFT CUSTOM

## (undated) DEVICE — CATH ANGIO INFINITI JL5 4FRX100CM 538422

## (undated) DEVICE — KIT HAND CONTROL ACIST 014644 AR-P54

## (undated) RX ORDER — HEPARIN SODIUM 10000 [USP'U]/100ML
INJECTION, SOLUTION INTRAVENOUS
Status: DISPENSED
Start: 2018-11-26

## (undated) RX ORDER — METHYLPREDNISOLONE SODIUM SUCCINATE 125 MG/2ML
INJECTION, POWDER, LYOPHILIZED, FOR SOLUTION INTRAMUSCULAR; INTRAVENOUS
Status: DISPENSED
Start: 2018-11-26

## (undated) RX ORDER — LIDOCAINE HYDROCHLORIDE 10 MG/ML
INJECTION, SOLUTION EPIDURAL; INFILTRATION; INTRACAUDAL; PERINEURAL
Status: DISPENSED
Start: 2021-08-09

## (undated) RX ORDER — DIPHENHYDRAMINE HYDROCHLORIDE 50 MG/ML
INJECTION INTRAMUSCULAR; INTRAVENOUS
Status: DISPENSED
Start: 2018-11-26

## (undated) RX ORDER — METOPROLOL TARTRATE 1 MG/ML
INJECTION, SOLUTION INTRAVENOUS
Status: DISPENSED
Start: 2018-11-26

## (undated) RX ORDER — METOPROLOL TARTRATE 25 MG/1
TABLET, FILM COATED ORAL
Status: DISPENSED
Start: 2018-11-26

## (undated) RX ORDER — FENTANYL CITRATE 50 UG/ML
INJECTION, SOLUTION INTRAMUSCULAR; INTRAVENOUS
Status: DISPENSED
Start: 2021-08-09

## (undated) RX ORDER — FENTANYL CITRATE 50 UG/ML
INJECTION, SOLUTION INTRAMUSCULAR; INTRAVENOUS
Status: DISPENSED
Start: 2019-01-22

## (undated) RX ORDER — ASPIRIN 325 MG
TABLET ORAL
Status: DISPENSED
Start: 2018-11-26

## (undated) RX ORDER — ONDANSETRON 2 MG/ML
INJECTION INTRAMUSCULAR; INTRAVENOUS
Status: DISPENSED
Start: 2019-01-22

## (undated) RX ORDER — FUROSEMIDE 10 MG/ML
INJECTION INTRAMUSCULAR; INTRAVENOUS
Status: DISPENSED
Start: 2018-11-26

## (undated) RX ORDER — HEPARIN SODIUM 5000 [USP'U]/.5ML
INJECTION, SOLUTION INTRAVENOUS; SUBCUTANEOUS
Status: DISPENSED
Start: 2018-11-26

## (undated) RX ORDER — LABETALOL HYDROCHLORIDE 5 MG/ML
INJECTION, SOLUTION INTRAVENOUS
Status: DISPENSED
Start: 2019-01-22